# Patient Record
Sex: FEMALE | Race: ASIAN | Employment: FULL TIME | URBAN - METROPOLITAN AREA
[De-identification: names, ages, dates, MRNs, and addresses within clinical notes are randomized per-mention and may not be internally consistent; named-entity substitution may affect disease eponyms.]

---

## 2019-07-02 ENCOUNTER — APPOINTMENT (OUTPATIENT)
Dept: NUCLEAR MEDICINE | Age: 34
DRG: 871 | End: 2019-07-02
Attending: EMERGENCY MEDICINE
Payer: COMMERCIAL

## 2019-07-02 ENCOUNTER — APPOINTMENT (OUTPATIENT)
Dept: CT IMAGING | Age: 34
DRG: 871 | End: 2019-07-02
Attending: EMERGENCY MEDICINE
Payer: COMMERCIAL

## 2019-07-02 ENCOUNTER — APPOINTMENT (OUTPATIENT)
Dept: GENERAL RADIOLOGY | Age: 34
DRG: 871 | End: 2019-07-02
Attending: EMERGENCY MEDICINE
Payer: COMMERCIAL

## 2019-07-02 ENCOUNTER — APPOINTMENT (OUTPATIENT)
Dept: VASCULAR SURGERY | Age: 34
DRG: 871 | End: 2019-07-02
Attending: FAMILY MEDICINE
Payer: COMMERCIAL

## 2019-07-02 ENCOUNTER — HOSPITAL ENCOUNTER (INPATIENT)
Age: 34
LOS: 3 days | Discharge: HOME | DRG: 871 | End: 2019-07-05
Attending: EMERGENCY MEDICINE | Admitting: FAMILY MEDICINE
Payer: COMMERCIAL

## 2019-07-02 ENCOUNTER — APPOINTMENT (OUTPATIENT)
Dept: ULTRASOUND IMAGING | Age: 34
DRG: 871 | End: 2019-07-02
Attending: FAMILY MEDICINE
Payer: COMMERCIAL

## 2019-07-02 DIAGNOSIS — E86.0 SEVERE DEHYDRATION: ICD-10-CM

## 2019-07-02 DIAGNOSIS — I26.99 ACUTE PULMONARY EMBOLISM WITHOUT ACUTE COR PULMONALE, UNSPECIFIED PULMONARY EMBOLISM TYPE (HCC): Primary | ICD-10-CM

## 2019-07-02 DIAGNOSIS — N17.9 AKI (ACUTE KIDNEY INJURY) (HCC): ICD-10-CM

## 2019-07-02 DIAGNOSIS — J40 BRONCHITIS: ICD-10-CM

## 2019-07-02 DIAGNOSIS — A41.9 SEPTIC SHOCK (HCC): ICD-10-CM

## 2019-07-02 DIAGNOSIS — R65.21 SEPTIC SHOCK (HCC): ICD-10-CM

## 2019-07-02 PROBLEM — R06.02 SOB (SHORTNESS OF BREATH): Status: ACTIVE | Noted: 2019-07-02

## 2019-07-02 LAB
ALBUMIN SERPL-MCNC: 3.2 G/DL (ref 3.5–5)
ALBUMIN/GLOB SERPL: 0.5 {RATIO} (ref 1.1–2.2)
ALP SERPL-CCNC: 130 U/L (ref 45–117)
ALT SERPL-CCNC: 22 U/L (ref 12–78)
ANION GAP BLD CALC-SCNC: 22 MMOL/L (ref 10–20)
ANION GAP SERPL CALC-SCNC: 15 MMOL/L (ref 5–15)
APPEARANCE UR: ABNORMAL
APTT PPP: 28.6 SEC (ref 22.1–32)
ARTERIAL PATENCY WRIST A: NO
AST SERPL-CCNC: 53 U/L (ref 15–37)
ATRIAL RATE: 140 BPM
B PERT DNA SPEC QL NAA+PROBE: NOT DETECTED
BACTERIA URNS QL MICRO: NEGATIVE /HPF
BASE DEFICIT BLD-SCNC: 6 MMOL/L
BASOPHILS # BLD: 0 K/UL (ref 0–0.1)
BASOPHILS NFR BLD: 0 % (ref 0–1)
BDY SITE: ABNORMAL
BILIRUB SERPL-MCNC: 1.6 MG/DL (ref 0.2–1)
BILIRUB UR QL CFM: POSITIVE
BNP SERPL-MCNC: 498 PG/ML
BUN BLD-MCNC: 19 MG/DL (ref 9–20)
BUN SERPL-MCNC: 19 MG/DL (ref 6–20)
BUN/CREAT SERPL: 7 (ref 12–20)
C PNEUM DNA SPEC QL NAA+PROBE: NOT DETECTED
CA-I BLD-MCNC: 1 MMOL/L (ref 1.12–1.32)
CALCIUM SERPL-MCNC: 9.4 MG/DL (ref 8.5–10.1)
CALCULATED P AXIS, ECG09: 43 DEGREES
CALCULATED R AXIS, ECG10: 3 DEGREES
CALCULATED T AXIS, ECG11: 20 DEGREES
CHLORIDE BLD-SCNC: 97 MMOL/L (ref 98–107)
CHLORIDE SERPL-SCNC: 93 MMOL/L (ref 97–108)
CK SERPL-CCNC: 230 U/L (ref 26–192)
CO2 BLD-SCNC: 19 MMOL/L (ref 21–32)
CO2 SERPL-SCNC: 22 MMOL/L (ref 21–32)
COLOR UR: ABNORMAL
COMMENT, HOLDF: NORMAL
CREAT BLD-MCNC: 2.7 MG/DL (ref 0.6–1.3)
CREAT SERPL-MCNC: 2.86 MG/DL (ref 0.55–1.02)
D DIMER PPP FEU-MCNC: 12.53 MG/L FEU (ref 0–0.65)
DIAGNOSIS, 93000: NORMAL
DIFFERENTIAL METHOD BLD: ABNORMAL
EOSINOPHIL # BLD: 0 K/UL (ref 0–0.4)
EOSINOPHIL NFR BLD: 0 % (ref 0–7)
EPITH CASTS URNS QL MICRO: ABNORMAL /LPF
ERYTHROCYTE [DISTWIDTH] IN BLOOD BY AUTOMATED COUNT: 13.3 % (ref 11.5–14.5)
FLUAV AG NPH QL IA: NEGATIVE
FLUAV H1 2009 PAND RNA SPEC QL NAA+PROBE: NOT DETECTED
FLUAV H1 RNA SPEC QL NAA+PROBE: NOT DETECTED
FLUAV H3 RNA SPEC QL NAA+PROBE: NOT DETECTED
FLUAV SUBTYP SPEC NAA+PROBE: NOT DETECTED
FLUBV AG NOSE QL IA: NEGATIVE
FLUBV RNA SPEC QL NAA+PROBE: NOT DETECTED
GAS FLOW.O2 O2 DELIVERY SYS: ABNORMAL L/MIN
GAS FLOW.O2 SETTING OXYMISER: 2 L/M
GLOBULIN SER CALC-MCNC: 6.2 G/DL (ref 2–4)
GLUCOSE BLD-MCNC: 113 MG/DL (ref 65–100)
GLUCOSE SERPL-MCNC: 103 MG/DL (ref 65–100)
GLUCOSE UR STRIP.AUTO-MCNC: NEGATIVE MG/DL
HADV DNA SPEC QL NAA+PROBE: NOT DETECTED
HCG SERPL-ACNC: <1 MIU/ML (ref 0–6)
HCO3 BLD-SCNC: 18.1 MMOL/L (ref 22–26)
HCOV 229E RNA SPEC QL NAA+PROBE: NOT DETECTED
HCOV HKU1 RNA SPEC QL NAA+PROBE: NOT DETECTED
HCOV NL63 RNA SPEC QL NAA+PROBE: NOT DETECTED
HCOV OC43 RNA SPEC QL NAA+PROBE: NOT DETECTED
HCT VFR BLD AUTO: 38.1 % (ref 35–47)
HCT VFR BLD CALC: 33 % (ref 35–47)
HGB BLD-MCNC: 12.9 G/DL (ref 11.5–16)
HGB UR QL STRIP: ABNORMAL
HMPV RNA SPEC QL NAA+PROBE: NOT DETECTED
HPIV1 RNA SPEC QL NAA+PROBE: NOT DETECTED
HPIV2 RNA SPEC QL NAA+PROBE: NOT DETECTED
HPIV3 RNA SPEC QL NAA+PROBE: NOT DETECTED
HPIV4 RNA SPEC QL NAA+PROBE: NOT DETECTED
IMM GRANULOCYTES # BLD AUTO: 0 K/UL
IMM GRANULOCYTES NFR BLD AUTO: 0 %
KETONES UR QL STRIP.AUTO: ABNORMAL MG/DL
LACTATE BLD-SCNC: 7.31 MMOL/L (ref 0.4–2)
LACTATE SERPL-SCNC: 1.3 MMOL/L (ref 0.4–2)
LACTATE SERPL-SCNC: 5.5 MMOL/L (ref 0.4–2)
LEUKOCYTE ESTERASE UR QL STRIP.AUTO: NEGATIVE
LIPASE SERPL-CCNC: 139 U/L (ref 73–393)
LYMPHOCYTES # BLD: 0.4 K/UL (ref 0.8–3.5)
LYMPHOCYTES NFR BLD: 4 % (ref 12–49)
M PNEUMO DNA SPEC QL NAA+PROBE: NOT DETECTED
MAGNESIUM SERPL-MCNC: 2.4 MG/DL (ref 1.6–2.4)
MCH RBC QN AUTO: 30.1 PG (ref 26–34)
MCHC RBC AUTO-ENTMCNC: 33.9 G/DL (ref 30–36.5)
MCV RBC AUTO: 89 FL (ref 80–99)
MONOCYTES # BLD: 0.1 K/UL (ref 0–1)
MONOCYTES NFR BLD: 1 % (ref 5–13)
MYELOCYTES NFR BLD MANUAL: 1 %
NEUTS BAND NFR BLD MANUAL: 3 % (ref 0–6)
NEUTS SEG # BLD: 8.3 K/UL (ref 1.8–8)
NEUTS SEG NFR BLD: 91 % (ref 32–75)
NITRITE UR QL STRIP.AUTO: NEGATIVE
NRBC # BLD: 0 K/UL (ref 0–0.01)
NRBC BLD-RTO: 0 PER 100 WBC
P-R INTERVAL, ECG05: 122 MS
PCO2 BLD: 28 MMHG (ref 35–45)
PH BLD: 7.42 [PH] (ref 7.35–7.45)
PH UR STRIP: 5.5 [PH] (ref 5–8)
PLATELET # BLD AUTO: 250 K/UL (ref 150–400)
PMV BLD AUTO: 12.3 FL (ref 8.9–12.9)
PO2 BLD: 88 MMHG (ref 80–100)
POTASSIUM BLD-SCNC: 3.1 MMOL/L (ref 3.5–5.1)
POTASSIUM SERPL-SCNC: 4 MMOL/L (ref 3.5–5.1)
PROT SERPL-MCNC: 9.4 G/DL (ref 6.4–8.2)
PROT UR STRIP-MCNC: 30 MG/DL
Q-T INTERVAL, ECG07: 304 MS
QRS DURATION, ECG06: 82 MS
QTC CALCULATION (BEZET), ECG08: 464 MS
RBC # BLD AUTO: 4.28 M/UL (ref 3.8–5.2)
RBC #/AREA URNS HPF: ABNORMAL /HPF (ref 0–5)
RBC MORPH BLD: ABNORMAL
RSV RNA SPEC QL NAA+PROBE: NOT DETECTED
RV+EV RNA SPEC QL NAA+PROBE: NOT DETECTED
SAMPLES BEING HELD,HOLD: NORMAL
SAO2 % BLD: 97 % (ref 92–97)
SERVICE CMNT-IMP: ABNORMAL
SODIUM BLD-SCNC: 134 MMOL/L (ref 136–145)
SODIUM SERPL-SCNC: 130 MMOL/L (ref 136–145)
SP GR UR REFRACTOMETRY: 1.01 (ref 1–1.03)
SPECIMEN TYPE: ABNORMAL
THERAPEUTIC RANGE,PTTT: NORMAL SECS (ref 58–77)
TROPONIN I SERPL-MCNC: <0.05 NG/ML
TROPONIN I SERPL-MCNC: <0.05 NG/ML
UR CULT HOLD, URHOLD: NORMAL
UROBILINOGEN UR QL STRIP.AUTO: 2 EU/DL (ref 0.2–1)
VENTRICULAR RATE, ECG03: 140 BPM
WBC # BLD AUTO: 8.8 K/UL (ref 3.6–11)
WBC MORPH BLD: ABNORMAL
WBC URNS QL MICRO: ABNORMAL /HPF (ref 0–4)

## 2019-07-02 PROCEDURE — 74011000258 HC RX REV CODE- 258: Performed by: FAMILY MEDICINE

## 2019-07-02 PROCEDURE — 82550 ASSAY OF CK (CPK): CPT

## 2019-07-02 PROCEDURE — 87633 RESP VIRUS 12-25 TARGETS: CPT

## 2019-07-02 PROCEDURE — A9558 XE133 XENON 10MCI: HCPCS

## 2019-07-02 PROCEDURE — 83880 ASSAY OF NATRIURETIC PEPTIDE: CPT

## 2019-07-02 PROCEDURE — 80047 BASIC METABLC PNL IONIZED CA: CPT

## 2019-07-02 PROCEDURE — 80053 COMPREHEN METABOLIC PANEL: CPT

## 2019-07-02 PROCEDURE — 85730 THROMBOPLASTIN TIME PARTIAL: CPT

## 2019-07-02 PROCEDURE — 87899 AGENT NOS ASSAY W/OPTIC: CPT

## 2019-07-02 PROCEDURE — 74011250636 HC RX REV CODE- 250/636: Performed by: INTERNAL MEDICINE

## 2019-07-02 PROCEDURE — 83735 ASSAY OF MAGNESIUM: CPT

## 2019-07-02 PROCEDURE — 83605 ASSAY OF LACTIC ACID: CPT

## 2019-07-02 PROCEDURE — 85379 FIBRIN DEGRADATION QUANT: CPT

## 2019-07-02 PROCEDURE — 87804 INFLUENZA ASSAY W/OPTIC: CPT

## 2019-07-02 PROCEDURE — 65660000000 HC RM CCU STEPDOWN

## 2019-07-02 PROCEDURE — 71045 X-RAY EXAM CHEST 1 VIEW: CPT

## 2019-07-02 PROCEDURE — 82803 BLOOD GASES ANY COMBINATION: CPT

## 2019-07-02 PROCEDURE — 74011250637 HC RX REV CODE- 250/637: Performed by: EMERGENCY MEDICINE

## 2019-07-02 PROCEDURE — 83690 ASSAY OF LIPASE: CPT

## 2019-07-02 PROCEDURE — 81001 URINALYSIS AUTO W/SCOPE: CPT

## 2019-07-02 PROCEDURE — 87449 NOS EACH ORGANISM AG IA: CPT

## 2019-07-02 PROCEDURE — 96365 THER/PROPH/DIAG IV INF INIT: CPT

## 2019-07-02 PROCEDURE — 74011250636 HC RX REV CODE- 250/636: Performed by: EMERGENCY MEDICINE

## 2019-07-02 PROCEDURE — 36415 COLL VENOUS BLD VENIPUNCTURE: CPT

## 2019-07-02 PROCEDURE — 99285 EMERGENCY DEPT VISIT HI MDM: CPT

## 2019-07-02 PROCEDURE — 93005 ELECTROCARDIOGRAM TRACING: CPT

## 2019-07-02 PROCEDURE — 71250 CT THORAX DX C-: CPT

## 2019-07-02 PROCEDURE — 87040 BLOOD CULTURE FOR BACTERIA: CPT

## 2019-07-02 PROCEDURE — 85025 COMPLETE CBC W/AUTO DIFF WBC: CPT

## 2019-07-02 PROCEDURE — 96361 HYDRATE IV INFUSION ADD-ON: CPT

## 2019-07-02 PROCEDURE — 94640 AIRWAY INHALATION TREATMENT: CPT

## 2019-07-02 PROCEDURE — 74011000258 HC RX REV CODE- 258: Performed by: EMERGENCY MEDICINE

## 2019-07-02 PROCEDURE — 99218 HC RM OBSERVATION: CPT

## 2019-07-02 PROCEDURE — 74011250637 HC RX REV CODE- 250/637: Performed by: FAMILY MEDICINE

## 2019-07-02 PROCEDURE — 96366 THER/PROPH/DIAG IV INF ADDON: CPT

## 2019-07-02 PROCEDURE — 76770 US EXAM ABDO BACK WALL COMP: CPT

## 2019-07-02 PROCEDURE — 93970 EXTREMITY STUDY: CPT

## 2019-07-02 PROCEDURE — 96375 TX/PRO/DX INJ NEW DRUG ADDON: CPT

## 2019-07-02 PROCEDURE — 36600 WITHDRAWAL OF ARTERIAL BLOOD: CPT

## 2019-07-02 PROCEDURE — 84484 ASSAY OF TROPONIN QUANT: CPT

## 2019-07-02 PROCEDURE — 84702 CHORIONIC GONADOTROPIN TEST: CPT

## 2019-07-02 PROCEDURE — 74011000250 HC RX REV CODE- 250: Performed by: FAMILY MEDICINE

## 2019-07-02 PROCEDURE — 74011250636 HC RX REV CODE- 250/636: Performed by: FAMILY MEDICINE

## 2019-07-02 RX ORDER — HEPARIN SODIUM 5000 [USP'U]/ML
80 INJECTION, SOLUTION INTRAVENOUS; SUBCUTANEOUS ONCE
Status: COMPLETED | OUTPATIENT
Start: 2019-07-02 | End: 2019-07-02

## 2019-07-02 RX ORDER — ACETAMINOPHEN 325 MG/1
650 TABLET ORAL
Status: DISCONTINUED | OUTPATIENT
Start: 2019-07-02 | End: 2019-07-05 | Stop reason: HOSPADM

## 2019-07-02 RX ORDER — ACETAMINOPHEN 500 MG
1000 TABLET ORAL
Status: COMPLETED | OUTPATIENT
Start: 2019-07-02 | End: 2019-07-02

## 2019-07-02 RX ORDER — SODIUM CHLORIDE, SODIUM LACTATE, POTASSIUM CHLORIDE, CALCIUM CHLORIDE 600; 310; 30; 20 MG/100ML; MG/100ML; MG/100ML; MG/100ML
75 INJECTION, SOLUTION INTRAVENOUS CONTINUOUS
Status: DISCONTINUED | OUTPATIENT
Start: 2019-07-02 | End: 2019-07-05 | Stop reason: HOSPADM

## 2019-07-02 RX ORDER — SODIUM CHLORIDE 0.9 % (FLUSH) 0.9 %
10 SYRINGE (ML) INJECTION
Status: ACTIVE | OUTPATIENT
Start: 2019-07-02 | End: 2019-07-02

## 2019-07-02 RX ORDER — SODIUM CHLORIDE 9 MG/ML
100 INJECTION, SOLUTION INTRAVENOUS CONTINUOUS
Status: DISCONTINUED | OUTPATIENT
Start: 2019-07-02 | End: 2019-07-02

## 2019-07-02 RX ORDER — IPRATROPIUM BROMIDE AND ALBUTEROL SULFATE 2.5; .5 MG/3ML; MG/3ML
3 SOLUTION RESPIRATORY (INHALATION)
Status: DISCONTINUED | OUTPATIENT
Start: 2019-07-02 | End: 2019-07-03

## 2019-07-02 RX ORDER — VANCOMYCIN 2 GRAM/500 ML IN 0.9 % SODIUM CHLORIDE INTRAVENOUS
2000 ONCE
Status: COMPLETED | OUTPATIENT
Start: 2019-07-02 | End: 2019-07-02

## 2019-07-02 RX ORDER — LEVOFLOXACIN 5 MG/ML
750 INJECTION, SOLUTION INTRAVENOUS
Status: DISCONTINUED | OUTPATIENT
Start: 2019-07-02 | End: 2019-07-02

## 2019-07-02 RX ORDER — SODIUM CHLORIDE 0.9 % (FLUSH) 0.9 %
5-40 SYRINGE (ML) INJECTION AS NEEDED
Status: DISCONTINUED | OUTPATIENT
Start: 2019-07-02 | End: 2019-07-05 | Stop reason: HOSPADM

## 2019-07-02 RX ORDER — NORETHINDRONE ACETATE AND ETHINYL ESTRADIOL 1MG-20(21)
1 KIT ORAL DAILY
COMMUNITY

## 2019-07-02 RX ORDER — SODIUM CHLORIDE 0.9 % (FLUSH) 0.9 %
5-40 SYRINGE (ML) INJECTION EVERY 8 HOURS
Status: DISCONTINUED | OUTPATIENT
Start: 2019-07-02 | End: 2019-07-05 | Stop reason: HOSPADM

## 2019-07-02 RX ORDER — HEPARIN SODIUM 10000 [USP'U]/100ML
18-36 INJECTION, SOLUTION INTRAVENOUS
Status: DISCONTINUED | OUTPATIENT
Start: 2019-07-02 | End: 2019-07-02

## 2019-07-02 RX ORDER — ONDANSETRON 2 MG/ML
4 INJECTION INTRAMUSCULAR; INTRAVENOUS
Status: COMPLETED | OUTPATIENT
Start: 2019-07-02 | End: 2019-07-02

## 2019-07-02 RX ORDER — SODIUM CHLORIDE 0.9 % (FLUSH) 0.9 %
5-10 SYRINGE (ML) INJECTION AS NEEDED
Status: DISCONTINUED | OUTPATIENT
Start: 2019-07-02 | End: 2019-07-05 | Stop reason: HOSPADM

## 2019-07-02 RX ORDER — SODIUM CHLORIDE 9 MG/ML
75 INJECTION, SOLUTION INTRAVENOUS CONTINUOUS
Status: DISCONTINUED | OUTPATIENT
Start: 2019-07-02 | End: 2019-07-02 | Stop reason: SDUPTHER

## 2019-07-02 RX ORDER — BENZONATATE 100 MG/1
100 CAPSULE ORAL
Status: DISCONTINUED | OUTPATIENT
Start: 2019-07-02 | End: 2019-07-05 | Stop reason: HOSPADM

## 2019-07-02 RX ORDER — BENZONATATE 100 MG/1
100 CAPSULE ORAL ONCE
Status: COMPLETED | OUTPATIENT
Start: 2019-07-02 | End: 2019-07-02

## 2019-07-02 RX ORDER — AZITHROMYCIN 250 MG/1
250 TABLET, FILM COATED ORAL DAILY
COMMUNITY
Start: 2019-06-30 | End: 2019-07-05

## 2019-07-02 RX ADMIN — BENZONATATE 100 MG: 100 CAPSULE ORAL at 13:09

## 2019-07-02 RX ADMIN — ACETAMINOPHEN 1000 MG: 500 TABLET ORAL at 15:34

## 2019-07-02 RX ADMIN — SODIUM CHLORIDE, SODIUM LACTATE, POTASSIUM CHLORIDE, AND CALCIUM CHLORIDE 1000 ML: 600; 310; 30; 20 INJECTION, SOLUTION INTRAVENOUS at 12:19

## 2019-07-02 RX ADMIN — METHYLPREDNISOLONE SODIUM SUCCINATE 60 MG: 40 INJECTION, POWDER, FOR SOLUTION INTRAMUSCULAR; INTRAVENOUS at 22:20

## 2019-07-02 RX ADMIN — VANCOMYCIN HYDROCHLORIDE 2000 MG: 10 INJECTION, POWDER, LYOPHILIZED, FOR SOLUTION INTRAVENOUS at 20:55

## 2019-07-02 RX ADMIN — BENZONATATE 100 MG: 100 CAPSULE ORAL at 18:38

## 2019-07-02 RX ADMIN — CEFEPIME HYDROCHLORIDE 2 G: 2 INJECTION, POWDER, FOR SOLUTION INTRAVENOUS at 18:39

## 2019-07-02 RX ADMIN — SODIUM CHLORIDE, SODIUM LACTATE, POTASSIUM CHLORIDE, AND CALCIUM CHLORIDE 100 ML/HR: 600; 310; 30; 20 INJECTION, SOLUTION INTRAVENOUS at 18:48

## 2019-07-02 RX ADMIN — CEFTRIAXONE SODIUM 2 G: 2 INJECTION, POWDER, FOR SOLUTION INTRAMUSCULAR; INTRAVENOUS at 11:32

## 2019-07-02 RX ADMIN — SODIUM CHLORIDE, SODIUM LACTATE, POTASSIUM CHLORIDE, AND CALCIUM CHLORIDE 1000 ML: 600; 310; 30; 20 INJECTION, SOLUTION INTRAVENOUS at 12:26

## 2019-07-02 RX ADMIN — HEPARIN SODIUM 18 UNITS/KG/HR: 10000 INJECTION, SOLUTION INTRAVENOUS at 12:42

## 2019-07-02 RX ADMIN — IPRATROPIUM BROMIDE AND ALBUTEROL SULFATE 3 ML: .5; 3 SOLUTION RESPIRATORY (INHALATION) at 21:21

## 2019-07-02 RX ADMIN — SODIUM CHLORIDE 1000 ML: 900 INJECTION, SOLUTION INTRAVENOUS at 16:31

## 2019-07-02 RX ADMIN — LEVOFLOXACIN 750 MG: 5 INJECTION, SOLUTION INTRAVENOUS at 16:31

## 2019-07-02 RX ADMIN — METHYLPREDNISOLONE SODIUM SUCCINATE 60 MG: 40 INJECTION, POWDER, FOR SOLUTION INTRAMUSCULAR; INTRAVENOUS at 16:31

## 2019-07-02 RX ADMIN — SODIUM CHLORIDE, SODIUM LACTATE, POTASSIUM CHLORIDE, AND CALCIUM CHLORIDE 1000 ML: 600; 310; 30; 20 INJECTION, SOLUTION INTRAVENOUS at 11:13

## 2019-07-02 RX ADMIN — HEPARIN SODIUM 6450 UNITS: 5000 INJECTION INTRAVENOUS; SUBCUTANEOUS at 12:41

## 2019-07-02 RX ADMIN — SODIUM CHLORIDE 1000 ML: 900 INJECTION, SOLUTION INTRAVENOUS at 11:12

## 2019-07-02 RX ADMIN — ONDANSETRON 4 MG: 2 INJECTION INTRAMUSCULAR; INTRAVENOUS at 11:17

## 2019-07-02 RX ADMIN — DOXYCYCLINE 100 MG: 100 INJECTION, POWDER, LYOPHILIZED, FOR SOLUTION INTRAVENOUS at 14:46

## 2019-07-02 NOTE — CONSULTS
Infectious Disease Consult    Today's Date: 7/2/2019   Admit Date: 7/2/2019    Impression:   · Febrile illness with productive cough  · No specific risks/exposures other than recent travel to [place]urban sites  · BROCK, lactic acidosis    Plan:   · Cultures   · Respiratory PCR  · Broad coverage for typical/atypical organisms--change Levaquin to doxycycline    Anti-infectives:   · Vancomycin   · Cefepime  · Levaquin    Subjective:   Date of Consultation:  July 2, 2019  Referring Physician: Dr Matt Lobo    Patient is a 26 y.o. female admitted with progressive productive cough, dyspnea and fever. She has been ill for about ten days and states that she has had cough productive of yellowish sputum that has progressed to blood-streaking of the sputum. She has had fevers and chills. She has no specific exposures, risks, etc that I can determine. Patient Active Problem List   Diagnosis Code    Hypercholesterolemia E78.00    Pulmonary emboli (HCC) I26.99    SOB (shortness of breath) R06.02     Past Medical History:   Diagnosis Date    Back pain     L5 S1 Disk    Hypercholesterolemia       Family History   Problem Relation Age of Onset    Liver Disease Mother     High Cholesterol Mother     Elevated Lipids Mother     Liver Disease Father     Hypertension Father       Social History     Tobacco Use    Smoking status: Never Smoker   Substance Use Topics    Alcohol use: Yes     Alcohol/week: 0.0 oz     Types: 1 - 2 Glasses of wine per week     Comment: seldom     History reviewed. No pertinent surgical history. Prior to Admission medications    Medication Sig Start Date End Date Taking? Authorizing Provider   norethindrone-ethinyl estradiol (JUNEL FE 1/20, 28,) 1 mg-20 mcg (21)/75 mg (7) tab Take 1 Tab by mouth daily. Yes Provider, Historical   azithromycin (ZITHROMAX) 250 mg tablet Take 250 mg by mouth daily.  6/30/19 7/4/19 Yes Provider, Historical       Allergies   Allergen Reactions    Latex Other (comments)        Review of Systems:  Pertinent items are noted in the History of Present Illness. Objective:     Visit Vitals  /50   Pulse (!) 108   Temp 100.4 °F (38 °C)   Resp (!) 36   Wt 80.9 kg (178 lb 5.6 oz)   SpO2 98%   BMI 31.10 kg/m²     Temp (24hrs), Av.7 °F (37.6 °C), Min:98.9 °F (37.2 °C), Max:100.4 °F (38 °C)       Lines:  Peripheral IV:       Physical Exam:  General:  fatigued, mild distress  Neck:  normal and no erythema or exudates noted. Lungs:  rhonchi R base, L base  Heart:  regular rate and rhythm  Abdomen:  soft, non-tender. Bowel sounds normal. No masses,  no organomegaly  Skin:  no rash or abnormalities    Data Review:     CBC:  Recent Labs     19  1104   WBC 8.8   GRANS 91*   MONOS 1*   EOS 0   ANEU 8.3*   ABL 0.4*   HGB 12.9   HCT 38.1          BMP:  Recent Labs     19  1104   CREA 2.86*   BUN 19   *   K 4.0   CL 93*   CO2 22   AGAP 15   *       LFTS:  Recent Labs     19  1104   TBILI 1.6*   ALT 22   SGOT 53*   *   TP 9.4*   ALB 3.2*       Microbiology:     All Micro Results     Procedure Component Value Units Date/Time    INFLUENZA A & B AG (RAPID TEST) [[id number]] Collected:  19    Order Status:  Completed Specimen:  Nasopharyngeal from Nasal washing Updated:  19 180     Influenza A Antigen NEGATIVE         Influenza B Antigen NEGATIVE        CULTURE, MRSA [[id number]] Collected:  19    Order Status:  Completed Specimen:  Nares Updated:  19    URINE CULTURE HOLD SAMPLE [[id number]] Collected:  19    Order Status:  Completed Specimen:  Urine from Serum Updated:  19     Urine culture hold       URINE ON HOLD IN MICROBIOLOGY DEPT FOR 3 DAYS. IF UNPRESERVED URINE IS SUBMITTED, IT CANNOT BE USED FOR ADDITIONAL TESTING AFTER 24 HRS, RECOLLECTION WILL BE REQUIRED. NIDHI Ortiz, UR/CSF [[id number]]     Order Status:  Sent Specimen:  Other     LEGIONELLA PNEUMOPHILA AG, URINE [[id number]]     Order Status:  Sent Specimen:  Urine     RESPIRATORY PANEL,PCR,NASOPHARYNGEAL [[id number]]     Order Status:  Sent Specimen:  NASOPHARYNGEAL SWAB     INFLUENZA A & B AG (RAPID TEST) [[id number]]     Order Status:  Sent Specimen:  Nasopharyngeal     CULTURE, BLOOD, PAIRED [[id number]] Collected:  07/02/19 1104    Order Status:  Completed Specimen:  Blood Updated:  07/02/19 1152          Imaging:   Reviewed     Signed By: Trisha Man MD     July 2, 2019

## 2019-07-02 NOTE — H&P
[facility]  HISTORY AND PHYSICAL    Name:  Chele Ibarra  MR#:  [id number]  :  1985  ACCOUNT #:  [de-identified]  ADMIT DATE:  2019    CHIEF COMPLAINT:  Shortness of breath. HISTORY OF PRESENT ILLNESS:  The patient is a 17-year-old female with past medical history of hypercholesteremia and back pain, currently on OCPs, who presents to the hospital with the above-mentioned symptom. History was obtained from the patient. The patient appears to be in quite a distress. Reports that she started having flu-like symptoms about 9 days back. The patient reports that she had a cough associated with mild productive sputum which intermittently had \"specs of blood in it. \"  Also had some rhinorrhea and this was while she was back in [place] for a vacation. The patient reports that she came back to [place], continued to have shortness of breath, rhinorrhea and started developing headache. She took some Advil, but that did not seem to help, got concerned and went to [facility].  The patient reports meanwhile she had poor appetite, trouble eating and drinking, fatigue, had a fever of around 101, chills and chest pain. The patient reports that last Saturday, she went to [facility], it was thought that she has bronchitis, was given Zithromax, started taking her medication, but symptoms persisted. The patient reports that she went to [facility] again. They were concerned that the chest x-ray shows pneumonia, and she was sent to the ER for further management and evaluation. While sitting in bed, the patient appears to be quite tachypneic, has been taking shallow respirations. Reports that she has pain all over her chest in a band-like fashion, because of which she is not able to breathe very well. The patient reports that she has not had any history of blood clots and denies any swelling in her legs. The patient denies any tick bites, rashes or any other concerns or problems. The patient denies any respiratory illnesses in the past.  Denies any sick contacts. The patient currently denies any headache, blurry vision, sore throat, trouble swallowing, trouble with speech, any abdominal pain, constipation, diarrhea, urinary symptoms, focal or generalized neurological weakness, recent travel besides to [place], any falls, injuries, hematemesis, melena, hemoptysis, hematuria or any other concerns or problems. PAST MEDICAL HISTORY:  See above. HOME MEDICATIONS:  Currently, the patient is on Jennifer Sanon for birth control. ALLERGIES:  TO LATEX. SOCIAL HISTORY:  Denies tobacco abuse, occasional alcohol, denies IV drug abuse. Lives at home. FAMILY HISTORY:  Mother has history of liver disease. Mother has history of high cholesterol and elevated lipids. Father has history of liver disease. REVIEW OF SYMPTOMS:  All systems were reviewed and found to be essentially negative except for the symptoms mentioned above. PHYSICAL EXAMINATION:  VITAL SIGNS:  Temperature on arrival was 100.4, pulse 122, respiratory rate 31, blood pressure 106/65, pulse oximetry 96% on room air. GENERAL:  Alert, oriented x3, awake, moderately distressed, pleasant female, appears to be stated age. HEENT:  Pupils equal and reactive to light. Dry mucous membranes. Tympanic membranes clear. NECK:  Supple. CHEST:  Clear to auscultation bilaterally. HEART:  S1, S2 are heard, with shallow respirations. ABDOMEN:  Soft, nontender, nondistended. Bowel sounds are physiological.  EXTREMITIES:  No clubbing, no cyanosis, no edema. NEURO/PSYCH:  Pleasant mood and affect. Cranial nerves II through XII are grossly intact. No focal neurological deficits are noted. SKIN:  Warm. LABORATORY DATA:  White count 8.8, hemoglobin 12.9, hematocrit 38.1, platelets 184.   Sodium 130, potassium 4.0, chloride 93, bicarbonate 22, anion gap 15, glucose 103, BUN 19, creatinine 2.86, calcium 9.4, magnesium 2.4, bilirubin total 1.6, ALT 22, AST 53, alkaline phosphatase 130. Troponin less than 0.05. Beta HCG is less than 1. ABG shows a pH of 7.419, pCO2 of 28, pO2 of 88. Lactic acid on presentation was 7.31.  D-dimer was 12.53. PTT was 28.6. Repeat lactate was 5.5. CT of the chest shows multiple lung nodules. V/Q scan of the chest shows low probability for PE, MAC disease in the left lung base correlates with airspace disease, possible atelectasis on a CT. Renal ultrasound is pending. ASSESSMENT AND PLAN:  1. Shortness of breath with possible early sepsis:  Unclear etiology. Suspect underlying infection. V/Q scan with low probability of pulmonary embolism. We will start the patient on IV hydration, broad-spectrum antibiotics, DuoNebs, pulse oximetry monitoring, supportive care and continue to monitor. We will get influenza testing done. Pulmonary consult has been obtained and we will await Pulmonary input and continue to closely monitor. Further intervention per hospital course. We will also get echocardiogram and cycle troponins. May consider further imaging and diagnostics if symptoms persist.  Monitor for now. 2.  Acute renal failure:  Likely prerenal.  Start the patient on IV hydration, renal ultrasound, avoid nephrotoxic medication, renally dose all other medications. Repeat labs in the morning and continue to monitor. Further intervention per hospital course. Reassess as needed. 3.  Lactic acidosis:  Likely secondary to #1, possible impending sepsis. We will start the patient on IV hydration, provide broad-spectrum antibiotics, IV fluids, supportive care and close monitoring. Further intervention per hospital course. Reassess as needed. Trend white count. Monitor for now. 4.  Hyponatremia:  Mild, likely secondary to #1. IV hydration. Repeat labs in the morning and continue to monitor. 5.  Gastrointestinal and deep venous thrombosis prophylaxes:   The patient will be on sequential compression devices.       Juan Bar MD [id number]  D:  07/02/2019 16:12  T:  07/02/2019 17:33  JOB #:  [id number]

## 2019-07-02 NOTE — ED NOTES
Bedside and Verbal shift change report given to Ebony Christianson (oncoming nurse) by [address] (offgoing nurse). Report included the following information SBAR, Kardex, ED Summary, [facility] and Recent Results.

## 2019-07-02 NOTE — ROUTINE PROCESS
TRANSFER - OUT REPORT: 
 
Verbal report given to MATTHEW Vazquez(name) on Rainer Freeman  being transferred to [facility](unit) for routine progression of care Report consisted of patients Situation, Background, Assessment and  
Recommendations(SBAR). Information from the following report(s) SBAR, Kardex, Intake/Output, MAR and Recent Results was reviewed with the receiving nurse. Lines:  
Peripheral IV 07/02/19 Left Wrist (Active) Site Assessment Clean, dry, & intact 7/2/2019 11:15 AM  
Phlebitis Assessment 0 7/2/2019 11:15 AM  
Infiltration Assessment 0 7/2/2019 11:15 AM  
Dressing Status Clean, dry, & intact 7/2/2019 11:15 AM  
   
Peripheral IV 07/02/19 Right Wrist (Active) Site Assessment Clean, dry, & intact 7/2/2019 12:34 PM  
Phlebitis Assessment 0 7/2/2019 12:34 PM  
Infiltration Assessment 0 7/2/2019 12:34 PM  
Dressing Status Clean, dry, & intact 7/2/2019 12:34 PM  
Dressing Type Transparent 7/2/2019 12:34 PM  
Hub Color/Line Status Pink;Capped;Flushed;Patent 7/2/2019 12:34 PM  
Alcohol Cap Used Yes 7/2/2019 12:34 PM  
  
 
Opportunity for questions and clarification was provided.    
 
Patient currently in Vascular, patient will transport to floor following exam 
 
 
 
 
\

## 2019-07-02 NOTE — PROGRESS NOTES
TRANSFER - IN REPORT:    Verbal report received from humberto(trenton) on Desiree Tipton  being received from ED(unit) for routine progression of care      Report consisted of patients Situation, Background, Assessment and   Recommendations(SBAR). Information from the following report(s) SBAR, MAR and Recent Results was reviewed with the receiving nurse. Opportunity for questions and clarification was provided. Assessment completed upon patients arrival to unit and care assumed.

## 2019-07-02 NOTE — PROGRESS NOTES
Admission Medication Reconciliation:    Information obtained from:  Patient/RxQuery    Comments/Recommendations: Updated PTA meds/reviewed patient's allergies. 1)  Spoke with patient in the room. Patient states that she hasn't taken her medications past ~2 days. 2)  Medication changes (since last review): Added  - Azithromycin 250mg 2 tabs on day 1, 1tab po daily thereafter    Removed  - Simvastatin 10mg 1tab po daily  - Fenofibrate 54mg 1tab po daily       Allergies:  Latex    Significant PMH/Disease States:   Past Medical History:   Diagnosis Date    Back pain     L5 S1 Disk    Hypercholesterolemia        Chief Complaint for this Admission:    Chief Complaint   Patient presents with    Cough       Prior to Admission Medications:   Prior to Admission Medications   Prescriptions Last Dose Informant Patient Reported? Taking? azithromycin (ZITHROMAX) 250 mg tablet   Yes Yes   Sig: Take 250 mg by mouth daily. norethindrone-ethinyl estradiol (JUNEL FE 1/20, 28,) 1 mg-20 mcg (21)/75 mg (7) tab   Yes Yes   Sig: Take 1 Tab by mouth daily.       Facility-Administered Medications: None

## 2019-07-02 NOTE — CONSULTS
Name: [facility]: [facility]   : 1985 Admit Date: 2019   Phone: [phone]  Room: [place] [address]   PCP: None  MRN: [id number]   Date: 2019  Code: No Order        HPI:    4:36 PM       History was obtained from patient. I was asked by Alfredo Suresh MD to see Janeth Amaya in consultation for a chief complaint of chest pain. History of Present Illness: 26year old female with past medical history as given below who presented with chest pressure and cough productive of yellow to bloody sputum. Patient developed flu like symptoms with severe rigors 10 days back. She also has cough with green yellow sputum. Went to Urgent care and was prescribed Zithromax that she only took for 3 days. 3 days started having more cough productive of green sputum and today morning developed blood tinged sputum that prompted her to come to the hospital.    Has developed chest pressure and coughing on deep inspiration. No nausea, vomiting or diarrhea but was very fatigued and did not drink much water. No uop since morning. Data:  Wbc 8.8 (3% bands)  Cr 2.86  Lactate 5.5  VQ scan low prob. CT Chest: bilateral sub pleural nodules, some of them cavitary. ABg with resp alkaosis and met acidosis. Received 4 liters of iv fluids. ABg doxy, cefepime and levaquin. Past Medical History:   Diagnosis Date    Back pain     L5 S1 Disk    Hypercholesterolemia        History reviewed. No pertinent surgical history. Family History   Problem Relation Age of Onset    Liver Disease Mother     High Cholesterol Mother     Elevated Lipids Mother     Liver Disease Father     Hypertension Father        Social History     Tobacco Use    Smoking status: Never Smoker   Substance Use Topics    Alcohol use:  Yes     Alcohol/week: 0.0 oz     Types: 1 - 2 Glasses of wine per week     Comment: seldom       Allergies   Allergen Reactions    Latex Other (comments)       Current Facility-Administered Medications   Medication Dose Route Frequency    sodium chloride (NS) flush 5-10 mL  5-10 mL IntraVENous PRN    sodium chloride 0.9 % bolus infusion 100 mL  100 mL IntraVENous RAD ONCE    iopamidol (ISOVUE-370) 76 % injection 100 mL  100 mL IntraVENous RAD ONCE    sodium chloride (NS) flush 10 mL  10 mL IntraVENous RAD ONCE    levoFLOXacin (LEVAQUIN) 750 mg in D5W IVPB  750 mg IntraVENous Q48H    albuterol-ipratropium (DUO-NEB) 2.5 MG-0.5 MG/3 ML  3 mL Nebulization Q4H RT    methylPREDNISolone (PF) (SOLU-MEDROL) injection 60 mg  60 mg IntraVENous Q8H    0.9% sodium chloride infusion  100 mL/hr IntraVENous CONTINUOUS    sodium chloride 0.9 % bolus infusion 1,000 mL  1,000 mL IntraVENous ONCE     Current Outpatient Medications   Medication Sig    norethindrone-ethinyl estradiol (JUNEL FE 1/20, 28,) 1 mg-20 mcg (21)/75 mg (7) tab Take 1 Tab by mouth daily.  azithromycin (ZITHROMAX) 250 mg tablet Take 250 mg by mouth daily. REVIEW OF SYSTEMS   Negative except as stated in the HPI. Physical Exam:   Visit Vitals  /65 (BP 1 Location: Left arm, BP Patient Position: At rest)   Pulse (!) 122   Temp 100.4 °F (38 °C)   Resp (!) 31   Wt 80.9 kg (178 lb 5.6 oz)   SpO2 96%   BMI 31.10 kg/m²       General:  Alert, cooperative, no distress, appears stated age. Head:  Normocephalic, without obvious abnormality, atraumatic. Eyes:  Conjunctivae/corneas clear. PERRL, EOMs intact. Nose: Nares normal. Septum midline. Mucosa normal. No drainage or sinus tenderness. Throat: Lips, mucosa, and tongue normal. Teeth and gums normal.   Neck: Supple, symmetrical, trachea midline, no adenopathy, thyroid: no enlargment/tenderness/nodules, no carotid bruit and no JVD. Back:   Symmetric, no curvature. ROM normal.   Lungs:   Bilateral rhonchi   Chest wall:  No tenderness or deformity. Heart:  Regular rate and rhythm, S1, S2 normal, no murmur, click, rub or gallop.    Abdomen:   Soft, non-tender. Bowel sounds normal. No masses,  No organomegaly. Extremities: Extremities normal, atraumatic, no cyanosis or edema. Pulses: 2+ and symmetric all extremities. Skin: Skin color, texture, turgor normal. No rashes or lesions   Lymph nodes: Cervical, supraclavicular, and axillary nodes normal.   Neurologic: Grossly nonfocal       Lab Results   Component Value Date/Time    Sodium 130 (L) 07/02/2019 11:04 AM    Potassium 4.0 07/02/2019 11:04 AM    Chloride 93 (L) 07/02/2019 11:04 AM    CO2 22 07/02/2019 11:04 AM    BUN 19 07/02/2019 11:04 AM    Creatinine 2.86 (H) 07/02/2019 11:04 AM    Glucose 103 (H) 07/02/2019 11:04 AM    Calcium 9.4 07/02/2019 11:04 AM    Magnesium 2.4 07/02/2019 11:04 AM    Lactic acid 5.5 (HH) 07/02/2019 01:37 PM       Lab Results   Component Value Date/Time    WBC 8.8 07/02/2019 11:04 AM    HGB 12.9 07/02/2019 11:04 AM    PLATELET 926 78/94/9347 11:04 AM    MCV 89.0 07/02/2019 11:04 AM       Lab Results   Component Value Date/Time    aPTT 28.6 07/02/2019 12:25 PM    AST (SGOT) 53 (H) 07/02/2019 11:04 AM    Alk. phosphatase 130 (H) 07/02/2019 11:04 AM    Protein, total 9.4 (H) 07/02/2019 11:04 AM    Albumin 3.2 (L) 07/02/2019 11:04 AM    Globulin 6.2 (H) 07/02/2019 11:04 AM       Lab Results   Component Value Date/Time    PHI 7.419 07/02/2019 11:44 AM    PCO2I 28.0 (L) 07/02/2019 11:44 AM    PO2I 88 07/02/2019 11:44 AM    HCO3I 18.1 (L) 07/02/2019 11:44 AM       Lab Results   Component Value Date/Time    Troponin-I, Qt. <0.05 07/02/2019 11:04 AM       IMPRESSION:  ===========  -Sepsis; suspect from community acquired pneumonia.  -Lactic acidosis from dehydration/ sepsis. -BROCK. -Bilateral pulmonary nodule / pleural based - septic emboli not ruled out. PLAN:  ====  -Iv fluids. -ABX - add vanco. C/w cefepime and levaquin. No need for doxy.  -check RVP. -check urinary antigen for legionella and pneumoccal.  -Kidney US and renal consult, if renal functions do not reverse.   -ID consult.  -Echo for valves.     D/w Carli Schrader MD

## 2019-07-02 NOTE — ED PROVIDER NOTES
26 y.o. female with past medical history significant for hypercholesterolemia, back pain, who presents from with referral for chief complaint of cough. Pt has 9 days onset of an worsening, productive cough that's intermittently bloody and began after coming back from vacation in [place]. She had accompanying rhinorrhea and a HA at that time; advil taken for minimal relief. Pt's sx continued to deteriorate as she developed subsequent difficulty eating/drinking, fatigue, fever (peak 101), chills, b/l chest pain, and diaphoresis last Wednesday. Pt sought evaluation at [facility] last Saturday and was dx w/ bronchitis; rx Zithromax. Despite taking medication as instructed, pt continued to be symptomatic and sought repeat evaluation today at [facility]; cxr showed pna, referred to ED. No recent admission. Currently on birth control. There are no other acute medical concerns at this time. PCP: Kun Obrien MD    Note written by Nas Manriquez, as dictated by Joselin Zapata MD 10:55 AM     The history is provided by the patient. No  was used. Past Medical History:   Diagnosis Date    Back pain     L5 S1 Disk    Hypercholesterolemia        History reviewed. No pertinent surgical history.       Family History:   Problem Relation Age of Onset    Liver Disease Mother     High Cholesterol Mother     Elevated Lipids Mother     Liver Disease Father     Hypertension Father        Social History     Socioeconomic History    Marital status: SINGLE     Spouse name: Not on file    Number of children: Not on file    Years of education: Not on file    Highest education level: Not on file   Occupational History    Not on file   Social Needs    Financial resource strain: Not on file    Food insecurity:     Worry: Not on file     Inability: Not on file    Transportation needs:     Medical: Not on file     Non-medical: Not on file   Tobacco Use    Smoking status: Never Smoker Substance and Sexual Activity    Alcohol use: Yes     Alcohol/week: 0.0 oz     Types: 1 - 2 Glasses of wine per week     Comment: seldom    Drug use: No    Sexual activity: Not on file   Lifestyle    Physical activity:     Days per week: Not on file     Minutes per session: Not on file    Stress: Not on file   Relationships    Social connections:     Talks on phone: Not on file     Gets together: Not on file     Attends Rastafari service: Not on file     Active member of club or organization: Not on file     Attends meetings of clubs or organizations: Not on file     Relationship status: Not on file    Intimate partner violence:     Fear of current or ex partner: Not on file     Emotionally abused: Not on file     Physically abused: Not on file     Forced sexual activity: Not on file   Other Topics Concern    Not on file   Social History Narrative    Not on file         ALLERGIES: Latex    Review of Systems   Constitutional: Positive for appetite change, chills, diaphoresis, fatigue and fever. HENT: Positive for rhinorrhea. Respiratory: Positive for cough. Neurological: Positive for headaches. All other systems reviewed and are negative. Vitals:    07/02/19 1145 07/02/19 1230 07/02/19 1300 07/02/19 1447   BP: 118/66 109/62 115/66 106/65   Pulse: (!) 137 (!) 131 (!) 129 (!) 122   Resp: (!) 31 (!) 32 (!) 37 (!) 31   Temp:    100.4 °F (38 °C)   SpO2: 95% 99% 99% 96%   Weight:                Physical Exam   Constitutional: She appears well-developed and well-nourished. She appears ill. No distress. HENT:   Head: Normocephalic and atraumatic. Eyes: Conjunctivae are normal.   Neck: Neck supple. Cardiovascular: Regular rhythm. Tachycardia present. Pulmonary/Chest: Effort normal and breath sounds normal. No respiratory distress. Abdominal: She exhibits no distension. There is tenderness in the epigastric area. Musculoskeletal: Normal range of motion. She exhibits no deformity. Neurological: She is alert. No cranial nerve deficit. Skin: Skin is warm and dry. Psychiatric: Her behavior is normal.   Nursing note and vitals reviewed. Note written by Nas Collazo, as dictated by Kennedy Nieto MD 10:55 AM    MDM  Number of Diagnoses or Management Options  Critical Care  Total time providing critical care: 30-74 minutes (Total critical care time spent exclusive of procedures:  40 minutes.  )       26 y.o. female presents with concern for pneumonia from urgent care. Arrives with tachycardia, tachypnea and very uncomfortable. With bilateral chest discomfort. She developed some small volume hemoptysis as well. Her CXR and CT wo contrast are not convincing for pneumonia and she is afebrile with normal WBC count. Her dimer is elevated, she has recent travel history and is on OCP. She is being treated empirically with heparin for suspected PE given her hemodynamic profile but was covered and fluid resuscitated for sepsis d/t CAP. Pending V/Q for further risk stratification for PE but pretest probability is high. Unable to undergo CTA because of BROCK. Procedures  ED EKG interpretation:  Rhythm: sinus tachycardia; Rate (approx.): 140; ST/T wave: normal;   Note written by Elsie Tamez, as dictated by Kennedy Nieto MD 11:06 AM    PROGRESS NOTE:  11:07 AM  POC lactate 7.31. Code sepsis called. PROGRESS NOTE:  11:32 AM  Wbc is 8. CXR reviewed; clear. Ordered anti-coagulation for possible PE. PROGRESS NOTE:  12:48 PM  CT negative for PNA. PROGRESS NOTE:  1:21 PM  D-dimer 12.53    Hospitalist Diego for Admission  1:22 PM    ED Room Number: ER25/25  Patient Name and age:  Jerica Machado 26 y.o.  female  Working Diagnosis:   1. Acute pulmonary embolism without acute cor pulmonale, unspecified pulmonary embolism type ([facility] [address]    2. BROCK (acute kidney injury) ([facility] [address]    3. Severe dehydration    4.  Septic shock ([facility] [address]      Readmission: no  Isolation Requirements: no  Recommended Level of Care:  ICU  Code Status:  Full

## 2019-07-02 NOTE — ED TRIAGE NOTES
Patient presents from home with complaints of SOB and chest pain that  started on Sunday. Patient reports flu like symptoms that started on Wednesday and progressed. Patient was diagnosed with bronchitis on Saturday at [facility] and given antibiotics.   Patient went back to [facility] today and was referred here

## 2019-07-03 ENCOUNTER — APPOINTMENT (OUTPATIENT)
Dept: NON INVASIVE DIAGNOSTICS | Age: 34
DRG: 871 | End: 2019-07-03
Attending: FAMILY MEDICINE
Payer: COMMERCIAL

## 2019-07-03 LAB
ANION GAP SERPL CALC-SCNC: 8 MMOL/L (ref 5–15)
BACTERIA SPEC CULT: NORMAL
BACTERIA SPEC CULT: NORMAL
BASOPHILS # BLD: 0 K/UL (ref 0–0.1)
BASOPHILS NFR BLD: 0 % (ref 0–1)
BUN SERPL-MCNC: 10 MG/DL (ref 6–20)
BUN/CREAT SERPL: 12 (ref 12–20)
CALCIUM SERPL-MCNC: 8.2 MG/DL (ref 8.5–10.1)
CHLORIDE SERPL-SCNC: 104 MMOL/L (ref 97–108)
CK SERPL-CCNC: 38 U/L (ref 26–192)
CO2 SERPL-SCNC: 25 MMOL/L (ref 21–32)
CREAT SERPL-MCNC: 0.86 MG/DL (ref 0.55–1.02)
DIFFERENTIAL METHOD BLD: ABNORMAL
ECHO AO ROOT DIAM: 3.25 CM
ECHO AV CUSP MM: 1.79 CM
ECHO AV PEAK GRADIENT: 8 MMHG
ECHO AV PEAK VELOCITY: 141.78 CM/S
ECHO LA MAJOR AXIS: 3.42 CM
ECHO LA TO AORTIC ROOT RATIO: 1.05
ECHO LV E' LATERAL VELOCITY: 11.93 CM/S
ECHO LV E' SEPTAL VELOCITY: 9.86 CM/S
ECHO LV INTERNAL DIMENSION DIASTOLIC: 4.38 CM (ref 3.9–5.3)
ECHO LV INTERNAL DIMENSION SYSTOLIC: 3.13 CM
ECHO LV IVSD: 0.91 CM (ref 0.6–0.9)
ECHO LV MASS 2D: 139.5 G (ref 67–162)
ECHO LV MASS INDEX 2D: 73.9 G/M2 (ref 43–95)
ECHO LV POSTERIOR WALL DIASTOLIC: 0.85 CM (ref 0.6–0.9)
ECHO LVOT PEAK GRADIENT: 3.6 MMHG
ECHO LVOT PEAK VELOCITY: 95.17 CM/S
ECHO MV A VELOCITY: 73.72 CM/S
ECHO MV AREA PHT: 5 CM2
ECHO MV E DECELERATION TIME (DT): 152.2 MS
ECHO MV E VELOCITY: 85.34 CM/S
ECHO MV E/A RATIO: 1.16
ECHO MV E/E' LATERAL: 7.15
ECHO MV E/E' RATIO (AVERAGED): 7.9
ECHO MV E/E' SEPTAL: 8.66
ECHO MV PRESSURE HALF TIME (PHT): 44.1 MS
ECHO PV MAX VELOCITY: 106.95 CM/S
ECHO PV PEAK GRADIENT: 4.6 MMHG
ECHO RV TAPSE: 2.39 CM (ref 1.5–2)
ECHO TV REGURGITANT MAX VELOCITY: 283.58 CM/S
ECHO TV REGURGITANT PEAK GRADIENT: 32.2 MMHG
EOSINOPHIL # BLD: 0 K/UL (ref 0–0.4)
EOSINOPHIL NFR BLD: 0 % (ref 0–7)
ERYTHROCYTE [DISTWIDTH] IN BLOOD BY AUTOMATED COUNT: 12.8 % (ref 11.5–14.5)
GLUCOSE SERPL-MCNC: 166 MG/DL (ref 65–100)
HCT VFR BLD AUTO: 29 % (ref 35–47)
HGB BLD-MCNC: 10 G/DL (ref 11.5–16)
IMM GRANULOCYTES # BLD AUTO: 0 K/UL
IMM GRANULOCYTES NFR BLD AUTO: 0 %
LYMPHOCYTES # BLD: 0.9 K/UL (ref 0.8–3.5)
LYMPHOCYTES NFR BLD: 5 % (ref 12–49)
MCH RBC QN AUTO: 30.4 PG (ref 26–34)
MCHC RBC AUTO-ENTMCNC: 34.5 G/DL (ref 30–36.5)
MCV RBC AUTO: 88.1 FL (ref 80–99)
MONOCYTES # BLD: 0 K/UL (ref 0–1)
MONOCYTES NFR BLD: 0 % (ref 5–13)
NEUTS BAND NFR BLD MANUAL: 4 % (ref 0–6)
NEUTS SEG # BLD: 16.7 K/UL (ref 1.8–8)
NEUTS SEG NFR BLD: 91 % (ref 32–75)
NRBC # BLD: 0 K/UL (ref 0–0.01)
NRBC BLD-RTO: 0 PER 100 WBC
PLATELET # BLD AUTO: 206 K/UL (ref 150–400)
PMV BLD AUTO: 10.5 FL (ref 8.9–12.9)
POTASSIUM SERPL-SCNC: 3.2 MMOL/L (ref 3.5–5.1)
RBC # BLD AUTO: 3.29 M/UL (ref 3.8–5.2)
RBC MORPH BLD: ABNORMAL
SERVICE CMNT-IMP: NORMAL
SODIUM SERPL-SCNC: 137 MMOL/L (ref 136–145)
WBC # BLD AUTO: 17.6 K/UL (ref 3.6–11)

## 2019-07-03 PROCEDURE — 82550 ASSAY OF CK (CPK): CPT

## 2019-07-03 PROCEDURE — 80048 BASIC METABOLIC PNL TOTAL CA: CPT

## 2019-07-03 PROCEDURE — 65660000000 HC RM CCU STEPDOWN

## 2019-07-03 PROCEDURE — 74011250636 HC RX REV CODE- 250/636: Performed by: INTERNAL MEDICINE

## 2019-07-03 PROCEDURE — 74011250636 HC RX REV CODE- 250/636: Performed by: FAMILY MEDICINE

## 2019-07-03 PROCEDURE — 74011250637 HC RX REV CODE- 250/637: Performed by: FAMILY MEDICINE

## 2019-07-03 PROCEDURE — 74011000258 HC RX REV CODE- 258: Performed by: FAMILY MEDICINE

## 2019-07-03 PROCEDURE — 74011000258 HC RX REV CODE- 258: Performed by: INTERNAL MEDICINE

## 2019-07-03 PROCEDURE — 85025 COMPLETE CBC W/AUTO DIFF WBC: CPT

## 2019-07-03 PROCEDURE — 93306 TTE W/DOPPLER COMPLETE: CPT

## 2019-07-03 PROCEDURE — 74011636637 HC RX REV CODE- 636/637: Performed by: FAMILY MEDICINE

## 2019-07-03 PROCEDURE — 36415 COLL VENOUS BLD VENIPUNCTURE: CPT

## 2019-07-03 RX ORDER — IPRATROPIUM BROMIDE AND ALBUTEROL SULFATE 2.5; .5 MG/3ML; MG/3ML
3 SOLUTION RESPIRATORY (INHALATION)
Status: DISCONTINUED | OUTPATIENT
Start: 2019-07-03 | End: 2019-07-03

## 2019-07-03 RX ORDER — LANOLIN ALCOHOL/MO/W.PET/CERES
400 CREAM (GRAM) TOPICAL DAILY
Status: DISCONTINUED | OUTPATIENT
Start: 2019-07-03 | End: 2019-07-05 | Stop reason: HOSPADM

## 2019-07-03 RX ORDER — POTASSIUM CHLORIDE 750 MG/1
40 TABLET, FILM COATED, EXTENDED RELEASE ORAL
Status: COMPLETED | OUTPATIENT
Start: 2019-07-03 | End: 2019-07-03

## 2019-07-03 RX ORDER — IPRATROPIUM BROMIDE AND ALBUTEROL SULFATE 2.5; .5 MG/3ML; MG/3ML
3 SOLUTION RESPIRATORY (INHALATION)
Status: DISCONTINUED | OUTPATIENT
Start: 2019-07-03 | End: 2019-07-05 | Stop reason: HOSPADM

## 2019-07-03 RX ORDER — PREDNISONE 20 MG/1
20 TABLET ORAL 2 TIMES DAILY WITH MEALS
Status: DISCONTINUED | OUTPATIENT
Start: 2019-07-03 | End: 2019-07-05 | Stop reason: HOSPADM

## 2019-07-03 RX ORDER — CODEINE PHOSPHATE AND GUAIFENESIN 10; 100 MG/5ML; MG/5ML
5 SOLUTION ORAL
Status: DISCONTINUED | OUTPATIENT
Start: 2019-07-03 | End: 2019-07-05 | Stop reason: HOSPADM

## 2019-07-03 RX ORDER — FAMOTIDINE 20 MG/1
20 TABLET, FILM COATED ORAL 2 TIMES DAILY
Status: DISCONTINUED | OUTPATIENT
Start: 2019-07-03 | End: 2019-07-05 | Stop reason: HOSPADM

## 2019-07-03 RX ORDER — MAG HYDROX/ALUMINUM HYD/SIMETH 200-200-20
30 SUSPENSION, ORAL (FINAL DOSE FORM) ORAL
Status: DISCONTINUED | OUTPATIENT
Start: 2019-07-03 | End: 2019-07-05 | Stop reason: HOSPADM

## 2019-07-03 RX ADMIN — BENZONATATE 100 MG: 100 CAPSULE ORAL at 04:14

## 2019-07-03 RX ADMIN — SODIUM CHLORIDE, SODIUM LACTATE, POTASSIUM CHLORIDE, AND CALCIUM CHLORIDE 75 ML/HR: 600; 310; 30; 20 INJECTION, SOLUTION INTRAVENOUS at 20:37

## 2019-07-03 RX ADMIN — ACETAMINOPHEN 650 MG: 325 TABLET ORAL at 22:42

## 2019-07-03 RX ADMIN — VANCOMYCIN HYDROCHLORIDE 1000 MG: 1 INJECTION, POWDER, LYOPHILIZED, FOR SOLUTION INTRAVENOUS at 08:51

## 2019-07-03 RX ADMIN — POTASSIUM CHLORIDE 40 MEQ: 750 TABLET, FILM COATED, EXTENDED RELEASE ORAL at 12:02

## 2019-07-03 RX ADMIN — MAGNESIUM OXIDE TAB 400 MG (241.3 MG ELEMENTAL MG) 400 MG: 400 (241.3 MG) TAB at 12:02

## 2019-07-03 RX ADMIN — FAMOTIDINE 20 MG: 20 TABLET ORAL at 09:46

## 2019-07-03 RX ADMIN — DOXYCYCLINE 100 MG: 100 INJECTION, POWDER, LYOPHILIZED, FOR SOLUTION INTRAVENOUS at 14:02

## 2019-07-03 RX ADMIN — Medication 10 ML: at 06:23

## 2019-07-03 RX ADMIN — PREDNISONE 20 MG: 20 TABLET ORAL at 17:49

## 2019-07-03 RX ADMIN — DOXYCYCLINE 100 MG: 100 INJECTION, POWDER, LYOPHILIZED, FOR SOLUTION INTRAVENOUS at 02:17

## 2019-07-03 RX ADMIN — CEFEPIME HYDROCHLORIDE 2 G: 2 INJECTION, POWDER, FOR SOLUTION INTRAVENOUS at 20:33

## 2019-07-03 RX ADMIN — ALUMINUM HYDROXIDE, MAGNESIUM HYDROXIDE, AND SIMETHICONE 30 ML: 200; 200; 20 SUSPENSION ORAL at 15:32

## 2019-07-03 RX ADMIN — ACETAMINOPHEN 650 MG: 325 TABLET ORAL at 04:16

## 2019-07-03 RX ADMIN — SODIUM CHLORIDE, SODIUM LACTATE, POTASSIUM CHLORIDE, AND CALCIUM CHLORIDE 100 ML/HR: 600; 310; 30; 20 INJECTION, SOLUTION INTRAVENOUS at 04:40

## 2019-07-03 RX ADMIN — Medication 10 ML: at 15:33

## 2019-07-03 RX ADMIN — CEFEPIME HYDROCHLORIDE 2 G: 2 INJECTION, POWDER, FOR SOLUTION INTRAVENOUS at 12:01

## 2019-07-03 RX ADMIN — METHYLPREDNISOLONE SODIUM SUCCINATE 60 MG: 40 INJECTION, POWDER, FOR SOLUTION INTRAMUSCULAR; INTRAVENOUS at 06:23

## 2019-07-03 RX ADMIN — PREDNISONE 20 MG: 20 TABLET ORAL at 12:02

## 2019-07-03 RX ADMIN — VANCOMYCIN HYDROCHLORIDE 1000 MG: 1 INJECTION, POWDER, LYOPHILIZED, FOR SOLUTION INTRAVENOUS at 17:49

## 2019-07-03 RX ADMIN — FAMOTIDINE 20 MG: 20 TABLET ORAL at 17:49

## 2019-07-03 RX ADMIN — Medication 20 ML: at 00:52

## 2019-07-03 RX ADMIN — ACETAMINOPHEN 650 MG: 325 TABLET ORAL at 12:02

## 2019-07-03 NOTE — PROGRESS NOTES
Hospitalist Progress Note          Marcial Magana MD  Please call  and page for questions. Call physician on-call through the  7pm-7am    Daily Progress Note: 7/3/2019    Primary care provider:None    Date of admission: 7/2/2019 10:46 AM    Admission summery and hospital course:  38-year-old female with past medical history of hypercholesteremia and back pain, currently on OCPs, who presents to the hospital with shortness of breath. Reports that she started having flu-like symptoms about 9 days back. The patient reports that she had a cough associated with mild productive sputum which intermittently had \"specs of blood in it. \"  She took some Advil, but that did not seem to help, got concerned and went to [facility].  The patient reports meanwhile she had poor appetite, trouble eating and drinking, fatigue, had a fever of around 101, chills and chest pain. The patient reports that last Saturday, she went to [facility], it was thought that she has bronchitis, was given Zithromax, started taking her medication, but symptoms persisted. The patient reports that she went to [facility] again. They were concerned that the chest x-ray shows pneumonia, and she was sent to the ER for further management and evaluation. The patient currently denies any headache, blurry vision, sore throat, trouble swallowing, trouble with speech, any abdominal pain, constipation, diarrhea, urinary symptoms, focal or generalized neurological weakness, recent travel besides to [place], any falls, injuries, hematemesis, melena, hemoptysis, hematuria or any other concerns or problems. Subjective:   Patient complaints of indigestion. Patient said her breathing is improving. Assessment/Plan:   Shortness of breath with possible early sepsis:    ? Etiology. Probably CAP. V/Q scan with low probability of pulmonarymbolism.  Continue IV hydration, broad-spectrum antibiotics, DuoNebs, pulse oximetry monitoring, supportive care and continue to monitor. Influenza A and B negative, follow S pneu antigen. Pulmonary and ID consult appreciated. Follow TTE report. Codeine PRN for cough. Acute renal failure:    Likely prerenal. Corrected with IVF. Continue IVF for today at slower rate. Lactic acidosis: Resolved with IVF and antibiotic. Hyponatremia: Corrected with IVF. Hypokalemia: Will replace. Will add magnesium. Indigestion: Probably steroid related. Will start with Pepcid and simethicone. Decrease prednisone. Leukocytosis: Probably related to steroid. Will monitor with decreased prednisone. Patient may not not need steroid for long. See orders for other plans. VTE prophylaxis:  SCD and mobilization. Code status: Full  Discussed plan of care with Patient/Family and Nurse. Patient boy friend is at the bedside. Pre-admission lived at home. Discharge planning: Possibly home in 1 or 2 days. Review of Systems:     Review of Systems:  Symptom  Y/N  Comments   Symptom  Y/N  Comments    Fever/Chills  n    Chest Pain  n    Poor Appetite  n    Edema  n     Cough  y   Abdominal Pain  n     Sputum  n   Joint Pain      SOB/CAMPBELL  n   Pruritis/Rash      Nausea/vomit  n   Tolerating PT/OT      Diarrhea  n   Tolerating Diet      Constipation     Other      Could not obtain due to:         Objective:   Physical Exam:     Visit Vitals  /77 (BP 1 Location: Left arm, BP Patient Position: At rest)   Pulse 83   Temp 97.8 °F (36.6 °C)   Resp 18   Ht 5' 4\" (1.626 m)   Wt 83.9 kg (184 lb 15.5 oz)   SpO2 93%   BMI 31.75 kg/m²      O2 Device: Room air    Temp (24hrs), Av.3 °F (36.8 °C), Min:96.1 °F (35.6 °C), Max:100.4 °F (38 °C)    No intake/output data recorded.  1901 -  0700  In: 18591 [P.O.:720; I.V.:9670]  Out: 2100 [Urine:2100]      General:  Alert, cooperative, no distress, appears stated age. Lungs:   Clear to auscultation bilaterally.    Heart:  Regular rate and rhythm, S1, S2 normal, no murmur. Abdomen:   Soft, non-tender. Bowel sounds normal.    Extremities: Extremities normal, atraumatic, no cyanosis or edema. Pulses: 2+ and symmetric all extremities. Skin: Skin color, texture, turgor normal. No rashes or lesions   Neurologic: CNII-XII intact. Data Review:       Recent Days:  Recent Labs     07/03/19  0437 07/02/19  1104   WBC 17.6* 8.8   HGB 10.0* 12.9   HCT 29.0* 38.1    250     Recent Labs     07/03/19  0437 07/02/19  1104    130*   K 3.2* 4.0    93*   CO2 25 22   * 103*   BUN 10 19   CREA 0.86 2.86*   CA 8.2* 9.4   MG  --  2.4   ALB  --  3.2*   SGOT  --  53*   ALT  --  22     No results for input(s): PH, PCO2, PO2, HCO3, FIO2 in the last 72 hours. 24 Hour Results:  Recent Results (from the past 24 hour(s))   EKG, 12 LEAD, INITIAL    Collection Time: 07/02/19 11:03 AM   Result Value Ref Range    Ventricular Rate 140 BPM    Atrial Rate 140 BPM    P-R Interval 122 ms    QRS Duration 82 ms    Q-T Interval 304 ms    QTC Calculation (Bezet) 464 ms    Calculated P Axis 43 degrees    Calculated R Axis 3 degrees    Calculated T Axis 20 degrees    Diagnosis       Sinus tachycardia  No previous ECGs available  Confirmed by Rebeca Daniels M.D., Chloé Cabrera ([id number]) on 7/2/2019 1:41:21 PM     CBC WITH AUTOMATED DIFF    Collection Time: 07/02/19 11:04 AM   Result Value Ref Range    WBC 8.8 3.6 - 11.0 K/uL    RBC 4.28 3.80 - 5.20 M/uL    HGB 12.9 11.5 - 16.0 g/dL    HCT 38.1 35.0 - 47.0 %    MCV 89.0 80.0 - 99.0 FL    MCH 30.1 26.0 - 34.0 PG    MCHC 33.9 30.0 - 36.5 g/dL    RDW 13.3 11.5 - 14.5 %    PLATELET 304 894 - 777 K/uL    MPV 12.3 8.9 - 12.9 FL    NRBC 0.0 0  WBC    ABSOLUTE NRBC 0.00 0.00 - 0.01 K/uL    NEUTROPHILS 91 (H) 32 - 75 %    BAND NEUTROPHILS 3 0 - 6 %    LYMPHOCYTES 4 (L) 12 - 49 %    MONOCYTES 1 (L) 5 - 13 %    EOSINOPHILS 0 0 - 7 %    BASOPHILS 0 0 - 1 %    MYELOCYTES 1 (H) 0 %    IMMATURE GRANULOCYTES 0 %    ABS. NEUTROPHILS 8.3 (H) 1.8 - 8.0 K/UL    ABS. LYMPHOCYTES 0.4 (L) 0.8 - 3.5 K/UL    ABS. MONOCYTES 0.1 0.0 - 1.0 K/UL    ABS. EOSINOPHILS 0.0 0.0 - 0.4 K/UL    ABS. BASOPHILS 0.0 0.0 - 0.1 K/UL    ABS. IMM. GRANS. 0.0 K/UL    DF MANUAL      RBC COMMENTS NATALIIA CELLS  PRESENT        WBC COMMENTS TOXIC GRANULATION     METABOLIC PANEL, COMPREHENSIVE    Collection Time: 07/02/19 11:04 AM   Result Value Ref Range    Sodium 130 (L) 136 - 145 mmol/L    Potassium 4.0 3.5 - 5.1 mmol/L    Chloride 93 (L) 97 - 108 mmol/L    CO2 22 21 - 32 mmol/L    Anion gap 15 5 - 15 mmol/L    Glucose 103 (H) 65 - 100 mg/dL    BUN 19 6 - 20 MG/DL    Creatinine 2.86 (H) 0.55 - 1.02 MG/DL    BUN/Creatinine ratio 7 (L) 12 - 20      GFR est AA 23 (L) >60 ml/min/1.73m2    GFR est non-AA 19 (L) >60 ml/min/1.73m2    Calcium 9.4 8.5 - 10.1 MG/DL    Bilirubin, total 1.6 (H) 0.2 - 1.0 MG/DL    ALT (SGPT) 22 12 - 78 U/L    AST (SGOT) 53 (H) 15 - 37 U/L    Alk. phosphatase 130 (H) 45 - 117 U/L    Protein, total 9.4 (H) 6.4 - 8.2 g/dL    Albumin 3.2 (L) 3.5 - 5.0 g/dL    Globulin 6.2 (H) 2.0 - 4.0 g/dL    A-G Ratio 0.5 (L) 1.1 - 2.2     CULTURE, BLOOD, PAIRED    Collection Time: 07/02/19 11:04 AM   Result Value Ref Range    Special Requests: NO SPECIAL REQUESTS      Culture result: NO GROWTH AFTER 18 HOURS     SAMPLES BEING HELD    Collection Time: 07/02/19 11:04 AM   Result Value Ref Range    SAMPLES BEING HELD 1RD,1BLU     COMMENT        Add-on orders for these samples will be processed based on acceptable specimen integrity and analyte stability, which may vary by analyte.    MAGNESIUM    Collection Time: 07/02/19 11:04 AM   Result Value Ref Range    Magnesium 2.4 1.6 - 2.4 mg/dL   TROPONIN I    Collection Time: 07/02/19 11:04 AM   Result Value Ref Range    Troponin-I, Qt. <0.05 <0.05 ng/mL   NT-PRO BNP    Collection Time: 07/02/19 11:04 AM   Result Value Ref Range    NT pro- (H) <125 PG/ML   BETA HCG, QT    Collection Time: 07/02/19 11:04 AM   Result Value Ref Range    Beta HCG, QT <1 0 - 6 MIU/ML   CK    Collection Time: 07/02/19 11:04 AM   Result Value Ref Range     (H) 26 - 192 U/L   POC LACTIC ACID    Collection Time: 07/02/19 11:06 AM   Result Value Ref Range    Lactic Acid (POC) 7.31 (HH) 0.40 - 2.00 mmol/L   POC CHEM8    Collection Time: 07/02/19 11:27 AM   Result Value Ref Range    Calcium, ionized (POC) 1.00 (L) 1.12 - 1.32 mmol/L    Sodium (POC) 134 (L) 136 - 145 mmol/L    Potassium (POC) 3.1 (L) 3.5 - 5.1 mmol/L    Chloride (POC) 97 (L) 98 - 107 mmol/L    CO2 (POC) 19 (L) 21 - 32 mmol/L    Anion gap (POC) 22 (H) 10 - 20 mmol/L    Glucose (POC) 113 (H) 65 - 100 mg/dL    BUN (POC) 19 9 - 20 mg/dL    Creatinine (POC) 2.7 (H) 0.6 - 1.3 mg/dL    GFRAA, POC 24 (L) >60 ml/min/1.73m2    GFRNA, POC 20 (L) >60 ml/min/1.73m2    Hematocrit (POC) 33 (L) 35.0 - 47.0 %    Comment Comment Not Indicated.      POC G3 - PUL    Collection Time: 07/02/19 11:44 AM   Result Value Ref Range    pH (POC) 7.419 7.35 - 7.45      pCO2 (POC) 28.0 (L) 35.0 - 45.0 MMHG    pO2 (POC) 88 80 - 100 MMHG    HCO3 (POC) 18.1 (L) 22 - 26 MMOL/L    sO2 (POC) 97 92 - 97 %    Base deficit (POC) 6 mmol/L    Site RIGHT RADIAL      Device: NASAL CANNULA      Flow rate (POC) 2.0 L/M    Allens test (POC) NO      Specimen type (POC) ARTERIAL     PTT    Collection Time: 07/02/19 12:25 PM   Result Value Ref Range    aPTT 28.6 22.1 - 32.0 sec    aPTT, therapeutic range     58.0 - 77.0 SECS   D DIMER    Collection Time: 07/02/19 12:25 PM   Result Value Ref Range    D-dimer 12.53 (H) 0.00 - 0.65 mg/L FEU   LACTIC ACID    Collection Time: 07/02/19  1:37 PM   Result Value Ref Range    Lactic acid 5.5 (HH) 0.4 - 2.0 MMOL/L   URINALYSIS W/MICROSCOPIC    Collection Time: 07/02/19  4:53 PM   Result Value Ref Range    Color DARK YELLOW      Appearance CLOUDY (A) CLEAR      Specific gravity 1.014 1.003 - 1.030      pH (UA) 5.5 5.0 - 8.0      Protein 30 (A) NEG mg/dL    Glucose NEGATIVE  NEG mg/dL    Ketone TRACE (A) NEG mg/dL    Blood LARGE (A) NEG      Urobilinogen 2.0 (H) 0.2 - 1.0 EU/dL    Nitrites NEGATIVE  NEG      Leukocyte Esterase NEGATIVE  NEG      WBC 0-4 0 - 4 /hpf    RBC 5-10 0 - 5 /hpf    Epithelial cells MODERATE (A) FEW /lpf    Bacteria NEGATIVE  NEG /hpf   URINE CULTURE HOLD SAMPLE    Collection Time: 07/02/19  4:53 PM   Result Value Ref Range    Urine culture hold        URINE ON HOLD IN MICROBIOLOGY DEPT FOR 3 DAYS. IF UNPRESERVED URINE IS SUBMITTED, IT CANNOT BE USED FOR ADDITIONAL TESTING AFTER 24 HRS, RECOLLECTION WILL BE REQUIRED.    BILIRUBIN, CONFIRM    Collection Time: 07/02/19  4:53 PM   Result Value Ref Range    Bilirubin UA, confirm POSITIVE (A) NEG     INFLUENZA A & B AG (RAPID TEST)    Collection Time: 07/02/19  4:54 PM   Result Value Ref Range    Influenza A Antigen NEGATIVE  NEG      Influenza B Antigen NEGATIVE  NEG     CULTURE, MRSA    Collection Time: 07/02/19  4:54 PM   Result Value Ref Range    Special Requests: NO SPECIAL REQUESTS      Culture result: MRSA NOT PRESENT AT 13 HOURS     TROPONIN I    Collection Time: 07/02/19  7:41 PM   Result Value Ref Range    Troponin-I, Qt. <0.05 <0.05 ng/mL   LACTIC ACID    Collection Time: 07/02/19  7:41 PM   Result Value Ref Range    Lactic acid 1.3 0.4 - 2.0 MMOL/L   LIPASE    Collection Time: 07/02/19  7:45 PM   Result Value Ref Range    Lipase 139 73 - 393 U/L   RESPIRATORY PANEL,PCR,NASOPHARYNGEAL    Collection Time: 07/02/19  8:57 PM   Result Value Ref Range    Adenovirus NOT DETECTED NOTD      Coronavirus 229E NOT DETECTED NOTD      Coronavirus HKU1 NOT DETECTED NOTD      Coronavirus CVNL63 NOT DETECTED NOTD      Coronavirus OC43 NOT DETECTED NOTD      Metapneumovirus NOT DETECTED NOTD      Rhinovirus and Enterovirus NOT DETECTED NOTD      Influenza A NOT DETECTED NOTD      Influenza A, subtype H1 NOT DETECTED NOTD      Influenza A, subtype H3 NOT DETECTED NOTD      INFLUENZA A H1N1 PCR NOT DETECTED NOTD      Influenza B NOT DETECTED NOTD Parainfluenza 1 NOT DETECTED NOTD      Parainfluenza 2 NOT DETECTED NOTD      Parainfluenza 3 NOT DETECTED NOTD      Parainfluenza virus 4 NOT DETECTED NOTD      RSV by PCR NOT DETECTED NOTD      Bordetella pertussis - PCR NOT DETECTED NOTD      Chlamydophila pneumoniae DNA, QL, PCR NOT DETECTED NOTD      Mycoplasma pneumoniae DNA, QL, PCR NOT DETECTED NOTD     CBC WITH AUTOMATED DIFF    Collection Time: 07/03/19  4:37 AM   Result Value Ref Range    WBC 17.6 (H) 3.6 - 11.0 K/uL    RBC 3.29 (L) 3.80 - 5.20 M/uL    HGB 10.0 (L) 11.5 - 16.0 g/dL    HCT 29.0 (L) 35.0 - 47.0 %    MCV 88.1 80.0 - 99.0 FL    MCH 30.4 26.0 - 34.0 PG    MCHC 34.5 30.0 - 36.5 g/dL    RDW 12.8 11.5 - 14.5 %    PLATELET 764 670 - 291 K/uL    MPV 10.5 8.9 - 12.9 FL    NRBC 0.0 0  WBC    ABSOLUTE NRBC 0.00 0.00 - 0.01 K/uL    NEUTROPHILS 91 (H) 32 - 75 %    BAND NEUTROPHILS 4 0 - 6 %    LYMPHOCYTES 5 (L) 12 - 49 %    MONOCYTES 0 (L) 5 - 13 %    EOSINOPHILS 0 0 - 7 %    BASOPHILS 0 0 - 1 %    IMMATURE GRANULOCYTES 0 %    ABS. NEUTROPHILS 16.7 (H) 1.8 - 8.0 K/UL    ABS. LYMPHOCYTES 0.9 0.8 - 3.5 K/UL    ABS. MONOCYTES 0.0 0.0 - 1.0 K/UL    ABS. EOSINOPHILS 0.0 0.0 - 0.4 K/UL    ABS. BASOPHILS 0.0 0.0 - 0.1 K/UL    ABS. IMM.  GRANS. 0.0 K/UL    DF MANUAL      RBC COMMENTS NORMOCYTIC, NORMOCHROMIC     CK    Collection Time: 07/03/19  4:37 AM   Result Value Ref Range    CK 38 26 - 530 U/L   METABOLIC PANEL, BASIC    Collection Time: 07/03/19  4:37 AM   Result Value Ref Range    Sodium 137 136 - 145 mmol/L    Potassium 3.2 (L) 3.5 - 5.1 mmol/L    Chloride 104 97 - 108 mmol/L    CO2 25 21 - 32 mmol/L    Anion gap 8 5 - 15 mmol/L    Glucose 166 (H) 65 - 100 mg/dL    BUN 10 6 - 20 MG/DL    Creatinine 0.86 0.55 - 1.02 MG/DL    BUN/Creatinine ratio 12 12 - 20      GFR est AA >60 >60 ml/min/1.73m2    GFR est non-AA >60 >60 ml/min/1.73m2    Calcium 8.2 (L) 8.5 - 10.1 MG/DL       Problem List:  Problem List as of 7/3/2019 Date Reviewed: 6/4/2013 Codes Class Noted - Resolved    Pulmonary emboli ([facility] [address] ICD-10-CM: I26.99  ICD-9-CM: 415.19  7/2/2019 - Present        SOB (shortness of breath) ICD-10-CM: R06.02  ICD-9-CM: 786.05  7/2/2019 - Present        Hypercholesterolemia ICD-10-CM: E78.00  ICD-9-CM: 272.0  7/23/2012 - Present              Medications reviewed  Current Facility-Administered Medications   Medication Dose Route Frequency    vancomycin (VANCOCIN) 1,000 mg in 0.9% sodium chloride (MBP/ADV) 250 mL  1,000 mg IntraVENous Q8H    famotidine (PEPCID) tablet 20 mg  20 mg Oral BID    alum-mag hydroxide-simeth (MYLANTA) oral suspension 30 mL  30 mL Oral Q4H PRN    sodium chloride (NS) flush 5-10 mL  5-10 mL IntraVENous PRN    sodium chloride (NS) flush 5-40 mL  5-40 mL IntraVENous Q8H    sodium chloride (NS) flush 5-40 mL  5-40 mL IntraVENous PRN    acetaminophen (TYLENOL) tablet 650 mg  650 mg Oral Q4H PRN    albuterol-ipratropium (DUO-NEB) 2.5 MG-0.5 MG/3 ML  3 mL Nebulization Q4H RT    methylPREDNISolone (PF) (SOLU-MEDROL) injection 60 mg  60 mg IntraVENous Q8H    lactated Ringers infusion  100 mL/hr IntraVENous CONTINUOUS    benzonatate (TESSALON) capsule 100 mg  100 mg Oral TID PRN    doxycycline (VIBRAMYCIN) 100 mg in 0.9% sodium chloride (MBP/ADV) 100 mL  100 mg IntraVENous Q12H    Vancomycin - pharmacy to dose   Other Rx Dosing/Monitoring    cefepime (MAXIPIME) 2 g in 0.9% sodium chloride (MBP/ADV) 100 mL  2 g IntraVENous Q24H       Care Plan discussed with: Patient/Family and Nurse    Total time spent with patient: 30 minutes.     Elfego Galloway MD

## 2019-07-03 NOTE — PROGRESS NOTES
ID Progress Note  7/3/2019    Subjective:     She is notably better today. Still with cough and pleuritis    Objective:     Antibiotics:  1. Cefepime   2. Vancomycin   3. Doxycycline       Vitals:   Visit Vitals  /85 (BP 1 Location: Left arm, BP Patient Position: At rest)   Pulse 80   Temp 97.6 °F (36.4 °C)   Resp 18   Ht 5' 4\" (1.626 m)   Wt 83.5 kg (184 lb)   SpO2 95%   BMI 31.58 kg/m²        Tmax:  Temp (24hrs), Av.7 °F (36.5 °C), Min:96.1 °F (35.6 °C), Max:98.4 °F (36.9 °C)      Exam:  Lungs:  diminished breath sounds R base, L base  Heart:  regular rate and rhythm  Abdomen:  soft, non-tender. Bowel sounds normal. No masses,  no organomegaly  Skin:  no rash or abnormalities    Labs:      Recent Labs     19  0437 19  1104   WBC 17.6* 8.8   HGB 10.0* 12.9    250   BUN 10 19   CREA 0.86 2.86*   SGOT  --  53*   AP  --  130*   TBILI  --  1.6*       Cultures:     No results found for: SDES  Lab Results   Component Value Date/Time    Culture result: MRSA NOT PRESENT AT 17 HOURS 2019 04:54 PM    Culture result: NO GROWTH AFTER 18 HOURS 2019 11:04 AM       Radiology:     Line/Insert Date:           Assessment:     1. Pneumonia vs bronchitis vs ?  2. Chest imaging with a few nodules  3. No epi risks of infection that I can determine    Objective:     1. Continue current therapy  2. Follow up cultures and studies  3.  Discussed with patient    Arian Gaston MD

## 2019-07-03 NOTE — PROGRESS NOTES
0000: Report received from [place], [address] [facility]. Pt in bed, resting quietly, boyfriend at bedside, on RA, MIV fluids running at 100mL/h. Plan for nephro consult today, needs to be assigned. 0400: Pt up ad yair to restroom, voided. No assistance provided & did not visualize urine. 3488: Pt complaining of nagging cough, PRN tessalon given. Clear sputum w/ scant blood noted. 1538: Pt complaining of mild CP r/t coughing, PRN tylenol given. 0435: Labs not appearing in [id number] [place] [address], reordered. Labs sent. 0800: Bedside and Verbal shift change report given to Gloria Castellon RN. Report included the following information SBAR, Intake/Output, MAR, Recent Results and Cardiac Rhythm NSR.

## 2019-07-03 NOTE — PROGRESS NOTES
"[facility], operated by [facility] [address] [facility]  History & Physical    Subjective:      Chief Complaint/Reason for Admission: Here for declot    Gilson Owens is a 70 y.o. male with ESRD (TTS at [facility] with Dr. Jarquin) who presents today for declot procedure after the dialysis unit noted no flow in his fistula.  Of note, he says the ecchymoses on his arm are chronically there.  He has had reactions to seafood in the past ("closing of the throat").    Past Medical History:   Diagnosis Date    Anemia     Atrial fibrillation     C. difficile colitis     Cataract     Coronary artery disease     Diabetes mellitus     Encounter for blood transfusion     Hyperlipidemia     Hypertension     Kidney disease, chronic, end stage on dialysis     Kidney stones     Restless leg     Sleep apnea     Sleep apnea     Venous insufficiency      Past Surgical History:   Procedure Laterality Date    CATARACT EXTRACTION, BILATERAL      EYE SURGERY      KIDNEY STONE SURGERY      leg stents      x2     PERITONEAL CATHETER INSERTION      PERITONEAL CATHETER REMOVAL      REMOVAL, CATHETER, CENTRAL VENOUS, TUNNELED N/A 9/10/2018    Performed by Ahsan Mendoza MD at [facility] CATH LAB     Family History   Problem Relation Age of Onset    Hypertension Mother     Aneurysm Father     Cancer Sister     Breast cancer Sister     Throat cancer Sister     Brain cancer Sister     Hypertension Sister     Heart disease Sister     Arthritis Sister     Parkinsonism Brother     Emphysema Brother      Social History     Tobacco Use    Smoking status: Former Smoker     Packs/day: 0.50     Years: 10.00     Pack years: 5.00     Types: Cigarettes, Pipe, Cigars     Last attempt to quit: 1979     Years since quittin.0    Smokeless tobacco: Never Used   Substance Use Topics    Alcohol use: Yes     Alcohol/week: 1.2 oz     Types: 2 Glasses of wine per week     Comment: occasionally    Drug use: No       PTA Medications " Day #2 of Vancomycin  Indication:  CAP  Current regimen:  2G loading dose given. MD not initiated at consult secondary to initially significant BROCK. Abx regimen:  Vanc, Doxycycline, and Cefepime  ID Following ?: YES  Concomitant nephrotoxic drugs (requires more frequent monitoring): None  Frequency of BMP?: complete metabolic panel set for am of 2019    Recent Labs     19  0437 19  1104   WBC 17.6* 8.8   CREA 0.86 2.86*   BUN 10 19     Est CrCl: ~95 ml/min; UO: Unmeasured occurrences at this time. Temp (24hrs), Av.2 °F (36.8 °C), Min:96.1 °F (35.6 °C), Max:100.4 °F (38 °C)    Cultures:   Blood:  pending  Respiratory viral panel:  negative    Goal trough = 15 - 20 mcg/mL    Recent trough history (date/time/level/dose/action taken):  New start    Plan: Begin 1000mg IV q8 hours now that the patient's BROCK has resolved with hydration. First maintenance dose can be as soon as possible. Cefepime dosing adjusted as well.   Medication Sig    amiodarone (PACERONE) 200 MG Tab Take 200 mg by mouth 2 (two) times daily.    aspirin (ECOTRIN) 81 MG EC tablet Take 81 mg by mouth once daily.    coenzyme Q10 300 mg Cap Take 300 mg by mouth once daily.    furosemide (LASIX) 40 MG tablet Take 40 mg by mouth. 4 days a week.    glucosamine-chondroitin 500-400 mg tablet Take 2 tablets by mouth once daily.    hydroCHLOROthiazide (HYDRODIURIL) 25 MG tablet TAKE 1 TABLET BY MOUTH AS DIRECTED    insulin glargine (LANTUS) 100 unit/mL injection Inject 25 Units into the skin every evening.     paricalcitol (ZEMPLAR) 1 MCG capsule Take 1 mcg by mouth once daily.    rOPINIRole (REQUIP) 2 MG tablet Take 2 mg by mouth 2 (two) times daily.    sevelamer carbonate (RENVELA) 800 mg Tab Take 1,600 mg by mouth 4 (four) times daily.    valsartan (DIOVAN) 80 MG tablet Take 80 mg by mouth once daily.    omega-3 acid ethyl esters (LOVAZA) 1 gram capsule Take 1 g by mouth once daily.    rOPINIRole (REQUIP) 0.25 MG tablet Take 0.5 mg by mouth once daily.     Review of patient's allergies indicates:   Allergen Reactions    Shrimp         Review of Systems   Constitutional: Negative.    Respiratory: Negative.    Cardiovascular: Negative.        Objective:      Vital Signs (Most Recent)  Temp: 98.1 °F (36.7 °C) (02/18/19 1030)  Pulse: 64 (02/18/19 1030)  Resp: 16 (02/18/19 1030)  BP: (!) 108/54 (02/18/19 1030)  SpO2: 100 % (02/18/19 1030)    Vital Signs Range (Last 24H):  Temp:  [98.1 °F (36.7 °C)]   Pulse:  [64]   Resp:  [16]   BP: (108)/(54)   SpO2:  [100 %]     Physical Exam   Constitutional: He is oriented to person, place, and time. He appears well-developed and well-nourished. No distress.   HENT:   Head: Normocephalic and atraumatic.   Eyes: EOM are normal.   Neck: Neck supple.   Pulmonary/Chest: Effort normal. No respiratory distress.   Musculoskeletal:   RUE brachiocephalic AVF with good thrill, appropriate pulse augmentation, full collapse on arm  elevation.  Large ecchymosis.  On US, the vessel diameter is 5 mm at the anastomosis and JA region, and has good caliber through the fistula, with some small hematoma with no evidence of active extravasation from the fistula and no compression of the fistula.  The fistula runs deep towards the axilla.   Neurological: He is alert and oriented to person, place, and time.   Skin: Skin is warm and dry. He is not diaphoretic.   Psychiatric: He has a normal mood and affect. His behavior is normal.       Assessment:      Active Hospital Problems    Diagnosis  POA    ESRD (end stage renal disease) [N18.6]  Yes      Resolved Hospital Problems   No resolved problems to display.       Plan:      Fistulogram today.  Risks explained, consent signed.  We will give benadryl 25 mg IV prior to the administration of IV contrast.

## 2019-07-03 NOTE — PROGRESS NOTES
ADULT PROTOCOL: JET AEROSOL ASSESSMENT    Patient  Ava Kiran     26 y.o.   female     7/3/2019  11:35 AM    Breath Sounds Pre Procedure:  Clear                                    Breath Sounds Post Procedure:  Clear                                      Breathing pattern: Pre procedure Breathing Pattern: Regular          Post procedure Breathing Pattern: Regular    Heart Rate: Pre procedure Pulse: 90           Post procedure      Resp Rate: Pre procedure Respirations: 18           Post procedure         Oxygen: O2 Device: Room air        SpO2: Pre procedure SpO2: 97 %      Nebulizer Therapy: Current medications Aerosolized Medications: DuoNeb      Changed: YES; PRN. Pt refusing scheduled breathing treatments.  BBS-clear        Problem List:   Patient Active Problem List   Diagnosis Code    Hypercholesterolemia E78.00    Pulmonary emboli (HCC) I26.99    SOB (shortness of breath) R06.02       Respiratory Therapist: Hannah Newman RT

## 2019-07-03 NOTE — PROGRESS NOTES
Pulmonary, Critical Care, and Sleep Medicine~Progress Note    Name: Allyson Magallanes MRN: [id number]   : 1985 Hospital: [address]   Date: 7/3/2019 10:09 AM Admission: 2019     Impression Plan   1. CAP  2. Bilateral pulmonary nodules and pleural based; septic emboli? No reported IV drug use    3. Lactic acidosis from dehydration and sepsis   4. BROCK, better  1. ABX per ID  2. ECHO pending  3. O2 titration above 90%   4. duonebs for now   5. Prn tomorrow; we will check back on Friday      Daily Progression:    + cough. Some congestion; mostly clear     I have reviewed the labs and previous days notes. Pertinent items are noted in HPI. OBJECTIVE:     Vital Signs:       Visit Vitals  /77 (BP 1 Location: Left arm, BP Patient Position: At rest)   Pulse 83   Temp 97.8 °F (36.6 °C)   Resp 18   Ht 5' 4\" (1.626 m)   Wt 83.9 kg (184 lb 15.5 oz)   SpO2 93%   BMI 31.75 kg/m²      Temp (24hrs), Av.3 °F (36.8 °C), Min:96.1 °F (35.6 °C), Max:100.4 °F (38 °C)     Intake/Output:     Last shift: No intake/output data recorded. Last 3 shifts: 1901 -  0700  In: 02577 [P.O.:720;  I.V.:9670]  Out: 2100 [Urine:2100]          Intake/Output Summary (Last 24 hours) at 7/3/2019 1009  Last data filed at 7/3/2019 0700  Gross per 24 hour   Intake 99204 ml   Output 2100 ml   Net 8290 ml       Physical Exam:                                        Exam Findings Other   General: No resp distress noted, appears stated age    [de-identified]:  No ulcers, JVD not elevated, no cervical LAD    Chest: No pectus deformity, normal chest rise b/l    HEART:  RRR, no murmurs/rubs/gallops    Lungs:  CTA b/l, no rhonchi/crackles/wheeze, diminished BS at bases    ABD: Soft/NT, non rigid mildly distended    EXT: No cyanosis/clubbing/edema, normal peripheral pulses    Skin: No rashes or ulcers, no mottling    Neuro: A/O x 3        Medications:  Current Facility-Administered Medications   Medication Dose Route Frequency    vancomycin (VANCOCIN) 1,000 mg in 0.9% sodium chloride (MBP/ADV) 250 mL  1,000 mg IntraVENous Q8H    famotidine (PEPCID) tablet 20 mg  20 mg Oral BID    alum-mag hydroxide-simeth (MYLANTA) oral suspension 30 mL  30 mL Oral Q4H PRN    potassium chloride SR (KLOR-CON 10) tablet 40 mEq  40 mEq Oral NOW    magnesium oxide (MAG-OX) tablet 400 mg  400 mg Oral DAILY    predniSONE (DELTASONE) tablet 20 mg  20 mg Oral BID WITH MEALS    sodium chloride (NS) flush 5-10 mL  5-10 mL IntraVENous PRN    sodium chloride (NS) flush 5-40 mL  5-40 mL IntraVENous Q8H    sodium chloride (NS) flush 5-40 mL  5-40 mL IntraVENous PRN    acetaminophen (TYLENOL) tablet 650 mg  650 mg Oral Q4H PRN    albuterol-ipratropium (DUO-NEB) 2.5 MG-0.5 MG/3 ML  3 mL Nebulization Q4H RT    lactated Ringers infusion  75 mL/hr IntraVENous CONTINUOUS    benzonatate (TESSALON) capsule 100 mg  100 mg Oral TID PRN    doxycycline (VIBRAMYCIN) 100 mg in 0.9% sodium chloride (MBP/ADV) 100 mL  100 mg IntraVENous Q12H    Vancomycin - pharmacy to dose   Other Rx Dosing/Monitoring    cefepime (MAXIPIME) 2 g in 0.9% sodium chloride (MBP/ADV) 100 mL  2 g IntraVENous Q24H       Labs:  ABG Recent Labs     07/02/19  1144   PHI 7.419   PCO2I 28.0*   PO2I 88   HCO3I 18.1*   SO2I 97        CBC Recent Labs     07/03/19 0437 07/02/19  1104   WBC 17.6* 8.8   HGB 10.0* 12.9   HCT 29.0* 38.1    250   MCV 88.1 89.0   MCH 30.4 38.8        Metabolic  Panel Recent Labs     07/03/19 0437 07/02/19  1104    130*   K 3.2* 4.0    93*   CO2 25 22   * 103*   BUN 10 19   CREA 0.86 2.86*   CA 8.2* 9.4   MG  --  2.4   ALB  --  3.2*   SGOT  --  53*   ALT  --  22        Pertinent Labs                TALIB Johnson  7/3/2019

## 2019-07-03 NOTE — PROGRESS NOTES
Problem: Sepsis: Day of Diagnosis  Goal: Treatments/Interventions/Procedures  Outcome: Progressing Towards Goal     Problem: Sepsis: Day of Diagnosis  Goal: Treatments/Interventions/Procedures  Outcome: Progressing Towards Goal     Problem: Sepsis: Day of Diagnosis  Goal: Treatments/Interventions/Procedures  Outcome: Progressing Towards Goal     Problem: Sepsis: Day of Diagnosis  Goal: Medications  Outcome: Progressing Towards Goal

## 2019-07-03 NOTE — CONSULTS
Labs reviewed  BROCK resolved, Cr normal  No renal issues at this time  Discussed with Dr. Max Burciaga  Please call us back with any issues      Ziyad Phan MD  [facility]   [address] [address]  Phone - [phone]   Fax - [phone]  [email or web address]

## 2019-07-03 NOTE — PROGRESS NOTES
Reason for Admission:   Acute pulmonary embolism without acute cor pulmonale, unspecified pulmonary embolism type                    RRAT Score:      2               Plan for utilizing home health:     Not at this time                     Current Advanced Directive/Advance Care Plan:  Full code with no advance care plan on file                         Transition of Care Plan:         Anticipated plan is home with family assistance. CM will continue to follow and assist with transition of care needs. 26year old female. AOx4. Independent with ADL's and IADL's. No DME utilized. Recently layed off, currently receiving severance pay. No current financial stressors. [facility] located in [place] is pharmacy utilized for prescriptions. Family to transport. Patient does not have a PCP. Patient requesting to be provided with a list of providers for her to follow-up. CM provided list of providers.     Care Management Interventions  PCP Verified by CM: No  Palliative Care Criteria Met (RRAT>21 & CHF Dx)?: No  Mode of Transport at Discharge: BLS  Transition of Care Consult (CM Consult): (No CM consults or needs)  Discharge Durable Medical Equipment: No  Physical Therapy Consult: No  Occupational Therapy Consult: No  Speech Therapy Consult: No  Current Support Network: Own Home, Other  Confirm Follow Up Transport: Family  Plan discussed with Pt/Family/Caregiver: Yes  Discharge Location  Discharge Placement: Home with family assistance(TBD/subject to change pending recommendations)    ROBB Gonzalez/JAVIER  Care Management  3:02 PM

## 2019-07-03 NOTE — PROGRESS NOTES
Pharmacist Note - Vancomycin Dosing    Consult provided for this 26 y.o. female for indication of sepsis, possibly due to CAP. Antibiotic regimen(s): Vanc + Cefepime +Doxycycline    Recent Labs     19  1104   WBC 8.8   CREA 2.86*   BUN 19     Frequency of BMP: daily x 3  Height: 124.5 cm  Weight: 80.9 kg  Est CrCl: 22 ml/min; UO: -- ml/kg/hr  Temp (24hrs), Av °F (37.2 °C), Min:98.3 °F (36.8 °C), Max:100.4 °F (38 °C)    Cultures:   blood - pending    Goal trough = 15 - 20 mcg/mL    Therapy will be initiated with a loading dose of 2000 mg IV x 1. Will await AM labs to determine future dosing/levels due to BROCK. Pharmacy to follow patient daily and order levels / make dose adjustments as appropriate.

## 2019-07-04 LAB
ALBUMIN SERPL-MCNC: 2.3 G/DL (ref 3.5–5)
ALBUMIN/GLOB SERPL: 0.5 {RATIO} (ref 1.1–2.2)
ALP SERPL-CCNC: 88 U/L (ref 45–117)
ALT SERPL-CCNC: 17 U/L (ref 12–78)
ANION GAP SERPL CALC-SCNC: 9 MMOL/L (ref 5–15)
AST SERPL-CCNC: 22 U/L (ref 15–37)
BASOPHILS # BLD: 0 K/UL (ref 0–0.1)
BASOPHILS NFR BLD: 0 % (ref 0–1)
BILIRUB SERPL-MCNC: 0.5 MG/DL (ref 0.2–1)
BUN SERPL-MCNC: 13 MG/DL (ref 6–20)
BUN/CREAT SERPL: 21 (ref 12–20)
CALCIUM SERPL-MCNC: 7.8 MG/DL (ref 8.5–10.1)
CHLORIDE SERPL-SCNC: 104 MMOL/L (ref 97–108)
CK SERPL-CCNC: 38 U/L (ref 26–192)
CO2 SERPL-SCNC: 25 MMOL/L (ref 21–32)
CREAT SERPL-MCNC: 0.63 MG/DL (ref 0.55–1.02)
DATE LAST DOSE: ABNORMAL
DIFFERENTIAL METHOD BLD: ABNORMAL
EOSINOPHIL # BLD: 0 K/UL (ref 0–0.4)
EOSINOPHIL NFR BLD: 0 % (ref 0–7)
ERYTHROCYTE [DISTWIDTH] IN BLOOD BY AUTOMATED COUNT: 13.4 % (ref 11.5–14.5)
FLUID CULTURE, SPNG2: NORMAL
GLOBULIN SER CALC-MCNC: 4.4 G/DL (ref 2–4)
GLUCOSE SERPL-MCNC: 134 MG/DL (ref 65–100)
HCT VFR BLD AUTO: 27.8 % (ref 35–47)
HGB BLD-MCNC: 9.3 G/DL (ref 11.5–16)
IMM GRANULOCYTES # BLD AUTO: 0 K/UL
IMM GRANULOCYTES NFR BLD AUTO: 0 %
L PNEUMO1 AG UR QL IA: NEGATIVE
LYMPHOCYTES # BLD: 1.3 K/UL (ref 0.8–3.5)
LYMPHOCYTES NFR BLD: 7 % (ref 12–49)
MCH RBC QN AUTO: 30.2 PG (ref 26–34)
MCHC RBC AUTO-ENTMCNC: 33.5 G/DL (ref 30–36.5)
MCV RBC AUTO: 90.3 FL (ref 80–99)
MONOCYTES # BLD: 0.9 K/UL (ref 0–1)
MONOCYTES NFR BLD: 5 % (ref 5–13)
NEUTS BAND NFR BLD MANUAL: 2 % (ref 0–6)
NEUTS SEG # BLD: 16.7 K/UL (ref 1.8–8)
NEUTS SEG NFR BLD: 86 % (ref 32–75)
NRBC # BLD: 0 K/UL (ref 0–0.01)
NRBC BLD-RTO: 0 PER 100 WBC
ORGANISM ID, SPNG3: NORMAL
PLATELET # BLD AUTO: 247 K/UL (ref 150–400)
PLATELET COMMENTS,PCOM: ABNORMAL
PLEASE NOTE, SPNG4: NORMAL
PMV BLD AUTO: 10.4 FL (ref 8.9–12.9)
POTASSIUM SERPL-SCNC: 3.8 MMOL/L (ref 3.5–5.1)
PROT SERPL-MCNC: 6.7 G/DL (ref 6.4–8.2)
RBC # BLD AUTO: 3.08 M/UL (ref 3.8–5.2)
RBC MORPH BLD: ABNORMAL
RBC MORPH BLD: ABNORMAL
REPORTED DOSE,DOSE: ABNORMAL UNITS
REPORTED DOSE/TIME,TMG: ABNORMAL
S PNEUM AG SPEC QL LA: NEGATIVE
SODIUM SERPL-SCNC: 138 MMOL/L (ref 136–145)
SPECIMEN SOURCE: NORMAL
SPECIMEN SOURCE: NORMAL
SPECIMEN, SPNG1: NORMAL
VANCOMYCIN TROUGH SERPL-MCNC: 10.3 UG/ML (ref 5–10)
WBC # BLD AUTO: 18.9 K/UL (ref 3.6–11)

## 2019-07-04 PROCEDURE — 80202 ASSAY OF VANCOMYCIN: CPT

## 2019-07-04 PROCEDURE — 36415 COLL VENOUS BLD VENIPUNCTURE: CPT

## 2019-07-04 PROCEDURE — 74011250637 HC RX REV CODE- 250/637: Performed by: FAMILY MEDICINE

## 2019-07-04 PROCEDURE — 74011250636 HC RX REV CODE- 250/636: Performed by: FAMILY MEDICINE

## 2019-07-04 PROCEDURE — 74011000258 HC RX REV CODE- 258: Performed by: INTERNAL MEDICINE

## 2019-07-04 PROCEDURE — 74011636637 HC RX REV CODE- 636/637: Performed by: FAMILY MEDICINE

## 2019-07-04 PROCEDURE — 94760 N-INVAS EAR/PLS OXIMETRY 1: CPT

## 2019-07-04 PROCEDURE — 80053 COMPREHEN METABOLIC PANEL: CPT

## 2019-07-04 PROCEDURE — 74011000258 HC RX REV CODE- 258: Performed by: FAMILY MEDICINE

## 2019-07-04 PROCEDURE — 77010033678 HC OXYGEN DAILY

## 2019-07-04 PROCEDURE — 65660000000 HC RM CCU STEPDOWN

## 2019-07-04 PROCEDURE — 85025 COMPLETE CBC W/AUTO DIFF WBC: CPT

## 2019-07-04 PROCEDURE — 74011250636 HC RX REV CODE- 250/636: Performed by: INTERNAL MEDICINE

## 2019-07-04 RX ORDER — VANCOMYCIN HYDROCHLORIDE
1250 EVERY 8 HOURS
Status: DISCONTINUED | OUTPATIENT
Start: 2019-07-05 | End: 2019-07-05 | Stop reason: HOSPADM

## 2019-07-04 RX ADMIN — ACETAMINOPHEN 650 MG: 325 TABLET ORAL at 22:59

## 2019-07-04 RX ADMIN — Medication 10 ML: at 23:04

## 2019-07-04 RX ADMIN — PREDNISONE 20 MG: 20 TABLET ORAL at 08:38

## 2019-07-04 RX ADMIN — DOXYCYCLINE 100 MG: 100 INJECTION, POWDER, LYOPHILIZED, FOR SOLUTION INTRAVENOUS at 02:32

## 2019-07-04 RX ADMIN — DOXYCYCLINE 100 MG: 100 INJECTION, POWDER, LYOPHILIZED, FOR SOLUTION INTRAVENOUS at 13:35

## 2019-07-04 RX ADMIN — CEFEPIME HYDROCHLORIDE 2 G: 2 INJECTION, POWDER, FOR SOLUTION INTRAVENOUS at 20:26

## 2019-07-04 RX ADMIN — SODIUM CHLORIDE, SODIUM LACTATE, POTASSIUM CHLORIDE, AND CALCIUM CHLORIDE 75 ML/HR: 600; 310; 30; 20 INJECTION, SOLUTION INTRAVENOUS at 22:56

## 2019-07-04 RX ADMIN — BENZONATATE 100 MG: 100 CAPSULE ORAL at 11:30

## 2019-07-04 RX ADMIN — VANCOMYCIN HYDROCHLORIDE 1000 MG: 1 INJECTION, POWDER, LYOPHILIZED, FOR SOLUTION INTRAVENOUS at 17:36

## 2019-07-04 RX ADMIN — GUAIFENESIN AND CODEINE PHOSPHATE 5 ML: 10; 100 LIQUID ORAL at 11:27

## 2019-07-04 RX ADMIN — VANCOMYCIN HYDROCHLORIDE 1000 MG: 1 INJECTION, POWDER, LYOPHILIZED, FOR SOLUTION INTRAVENOUS at 00:30

## 2019-07-04 RX ADMIN — ACETAMINOPHEN 650 MG: 325 TABLET ORAL at 04:50

## 2019-07-04 RX ADMIN — CEFEPIME HYDROCHLORIDE 2 G: 2 INJECTION, POWDER, FOR SOLUTION INTRAVENOUS at 11:27

## 2019-07-04 RX ADMIN — PREDNISONE 20 MG: 20 TABLET ORAL at 17:36

## 2019-07-04 RX ADMIN — CEFEPIME HYDROCHLORIDE 2 G: 2 INJECTION, POWDER, FOR SOLUTION INTRAVENOUS at 04:46

## 2019-07-04 RX ADMIN — FAMOTIDINE 20 MG: 20 TABLET ORAL at 08:38

## 2019-07-04 RX ADMIN — ACETAMINOPHEN 650 MG: 325 TABLET ORAL at 08:41

## 2019-07-04 RX ADMIN — Medication 10 ML: at 00:30

## 2019-07-04 RX ADMIN — VANCOMYCIN HYDROCHLORIDE 1000 MG: 1 INJECTION, POWDER, LYOPHILIZED, FOR SOLUTION INTRAVENOUS at 08:38

## 2019-07-04 RX ADMIN — MAGNESIUM OXIDE TAB 400 MG (241.3 MG ELEMENTAL MG) 400 MG: 400 (241.3 MG) TAB at 08:38

## 2019-07-04 RX ADMIN — FAMOTIDINE 20 MG: 20 TABLET ORAL at 17:36

## 2019-07-04 NOTE — PROGRESS NOTES
Bedside and Verbal shift change report given to [address] [place] [address] (oncoming nurse) by Chela Akers (offgoing nurse). Report included the following information SBAR, MAR, Recent Results and Med Rec Status.

## 2019-07-04 NOTE — PROGRESS NOTES
ID Progress Note  2019    Subjective:     Still with cough and pleuritis--small amount of blood still present in sputum    Objective:     Antibiotics:  1. Cefepime   2. Vancomycin   3. Doxycycline       Vitals:   Visit Vitals  /83 (BP 1 Location: Left arm, BP Patient Position: Sitting)   Pulse 76   Temp 97.7 °F (36.5 °C)   Resp 18   Ht 5' 4\" (1.626 m)   Wt 82.6 kg (182 lb 1.6 oz)   SpO2 96%   BMI 31.26 kg/m²        Tmax:  Temp (24hrs), Av.7 °F (36.5 °C), Min:97.5 °F (36.4 °C), Max:97.9 °F (36.6 °C)      Exam:  Lungs:  diminished breath sounds R base, L base  Heart:  regular rate and rhythm  Abdomen:  soft, non-tender. Bowel sounds normal. No masses,  no organomegaly  Skin:  no rash or abnormalities    Labs:      Recent Labs     19  0447 19  0437 19  1104   WBC 18.9* 17.6* 8.8   HGB 9.3* 10.0* 12.9    206 250   BUN 13 10 19   CREA 0.63 0.86 2.86*   SGOT 22  --  53*   AP 88  --  130*   TBILI 0.5  --  1.6*       Cultures:     No results found for: [facility]  Lab Results   Component Value Date/Time    Culture result: MRSA NOT PRESENT 2019 04:54 PM    Culture result:  2019 04:54 PM         Screening of patient nares for MRSA is for surveillance purposes and, if positive, to facilitate isolation considerations in high risk settings. It is not intended for automatic decolonization interventions per se as regimens are not sufficiently effective to warrant routine use. Culture result: NO GROWTH 2 DAYS 2019 11:04 AM       Radiology:     Line/Insert Date:           Assessment:     1. Pneumonia vs bronchitis vs ?  2. Chest imaging with a few nodules  3. No epi risks of infection that I can determine  4. Leukocytosis--on steroids    Objective:     1. Continue current therapy  2. Follow up cultures and studies  3.  Discussed with patient    Flor Ward MD

## 2019-07-04 NOTE — PROGRESS NOTES
Bedside and Verbal shift change report given to Karrie Hickman (oncoming nurse) by Radha Denney (offgoing nurse). Report included the following information SBAR, Procedure Summary, Intake/Output, MAR, Accordion, Recent Results, Med Rec Status and Cardiac Rhythm NSR.

## 2019-07-04 NOTE — PROGRESS NOTES
Pharmacist Note - Vancomycin Dosing  Therapy day 3  Indication: CAP  Current regimen: 1g IV Q8h    A Trough Level resulted at 10.3 mcg/mL which was obtained ~8 hrs post-dose. Goal trough: 15 - 20 mcg/mL     Plan: Change to vancomycin 1250mg IV Q8h . Pharmacy will continue to monitor this patient daily for changes in clinical status and renal function.

## 2019-07-04 NOTE — PROGRESS NOTES
Hospitalist Progress Note  [facility], NP  Answering service: [phone] from in house phone  Cell: [phone]      Date of Service:  2019  NAME:  Jerry Bueno  :  1985  MRN:  [id number]      Admission Summary:   26 yof pmh of hypercholesterolemia, back pain who presented with flu like symptoms that started about 9 days back. Sxs associated with cough, fever, poor appetite and chills. Interval history / Subjective:     Feels a little better today. Was able to eat about 50% of dinner last night. Still with cough that is worse in the morning. Tessalon Perles help with this. No fevers. Discussed echo results. Assessment & Plan:     Shortness of breath with possible early sepsis:    ? Etiology. Probably CAP ? Septic emboli. -V/Q scan with low probability of PE.   -IVF stopped, continue IV abx, on board spectrum currently  -Duobnebs. -Pulm and ID on board.   -Flu negative, Viral panel negative, Blood Cultures NGTD  -oral steroids   -echo ef 56-60% no wall abnormalities     Acute renal failure: resolved   -Likely prerenal. Corrected with IVF     Lactic acidosis: resolved  -Resolved with IVF and antibiotic.      Hyponatremia: resolved  -Corrected with IVF. Hypokalemia: resolved   -Replenished    Indigestion:   -Probably steroid related. -Continue Pepcid and simethicone. -Decrease prednisone yesterday.      Leukocytosis:   -Probably related to steroid.  Up today, no fevers, feeling better  -Will monitor with decreased prednisone.   -Patient may not not need steroid for long.      Code status: Full  DVT prophylaxis: SCDs    Care Plan discussed with: Patient/Family  Disposition: Home w/Family and TBD     Hospital Problems  Date Reviewed: 2013          Codes Class Noted POA    Pulmonary emboli ([address] ICD-10-CM: I26.99  ICD-9-CM: 415.19  2019 Unknown        SOB (shortness of breath) ICD-10-CM: R06.02  ICD-9-CM: 786.05  7/2/2019 Unknown                Review of Systems:   +Shortness of breath, cough with occasional streaks of blood, pain with deep inspiration, decrease appetite. No nausea or vomiting, fevers or chills. Last BM today. Vital Signs:    Last 24hrs VS reviewed since prior progress note. Most recent are:  Visit Vitals  /86 (BP 1 Location: Left arm, BP Patient Position: At rest)   Pulse 68   Temp 97.5 °F (36.4 °C)   Resp 18   Ht 5' 4\" (1.626 m)   Wt 82.6 kg (182 lb 1.6 oz)   SpO2 96%   BMI 31.26 kg/m²         Intake/Output Summary (Last 24 hours) at 7/4/2019 1031  Last data filed at 7/3/2019 2243  Gross per 24 hour   Intake 2315.83 ml   Output 400 ml   Net 1915.83 ml        Physical Examination:             Constitutional:  No acute distress, cooperative, pleasant    ENT:  Oral mucous moist, oropharynx benign. Neck supple,    Resp:  CTA bilaterally. No wheezing/rhonchi/rales. No accessory muscle use   CV:  Regular rhythm, normal rate, no murmurs, gallops, rubs    GI:  Soft, non distended, non tender. normoactive bowel sounds,     Musculoskeletal:  No edema, warm, 2+ pulses throughout    Neurologic:  Moves all extremities. AAOx3, CN II-XII reviewed     Psych:  Good insight, Not anxious nor agitated.        Data Review:    Review and/or order of clinical lab test  Review and/or order of tests in the radiology section of CPT  Review and/or order of tests in the medicine section of CPT      Labs:     Recent Labs     07/04/19 0447 07/03/19 0437   WBC 18.9* 17.6*   HGB 9.3* 10.0*   HCT 27.8* 29.0*    206     Recent Labs     07/04/19 0447 07/03/19 0437 07/02/19  1104    137 130*   K 3.8 3.2* 4.0    104 93*   CO2 25 25 22   BUN 13 10 19   CREA 0.63 0.86 2.86*   * 166* 103*   CA 7.8* 8.2* 9.4   MG  --   --  2.4     Recent Labs     07/04/19 0447 07/02/19 1945 07/02/19  1104   SGOT 22  --  53*   ALT 17  --  22   AP 88  --  130*   TBILI 0.5  --  1.6*   TP 6.7  --  9.4*   ALB 2.3* --  3.2*   GLOB 4.4*  --  6.2*   LPSE  --  139  --      Recent Labs     07/02/19  1225   APTT 28.6      No results for input(s): FE, TIBC, PSAT, FERR in the last 72 hours. No results found for: FOL, RBCF   No results for input(s): PH, PCO2, PO2 in the last 72 hours.   Recent Labs     07/03/19  0437 07/03/19  0400 07/02/19  1941 07/02/19  1104   CPK 38 38  --  230*   TROIQ  --   --  <0.05 <0.05     Lab Results   Component Value Date/Time    Cholesterol, total 164 06/04/2013 09:12 AM    HDL Cholesterol 69 06/04/2013 09:12 AM    LDL, calculated 69 06/04/2013 09:12 AM    Triglyceride 129 06/04/2013 09:12 AM     Lab Results   Component Value Date/Time    Glucose (POC) 113 (H) 07/02/2019 11:27 AM     Lab Results   Component Value Date/Time    Color DARK YELLOW 07/02/2019 04:53 PM    Appearance CLOUDY (A) 07/02/2019 04:53 PM    Specific gravity 1.014 07/02/2019 04:53 PM    pH (UA) 5.5 07/02/2019 04:53 PM    Protein 30 (A) 07/02/2019 04:53 PM    Glucose NEGATIVE  07/02/2019 04:53 PM    Ketone TRACE (A) 07/02/2019 04:53 PM    Urobilinogen 2.0 (H) 07/02/2019 04:53 PM    Nitrites NEGATIVE  07/02/2019 04:53 PM    Leukocyte Esterase NEGATIVE  07/02/2019 04:53 PM    Epithelial cells MODERATE (A) 07/02/2019 04:53 PM    Bacteria NEGATIVE  07/02/2019 04:53 PM    WBC 0-4 07/02/2019 04:53 PM    RBC 5-10 07/02/2019 04:53 PM         Medications Reviewed:     Current Facility-Administered Medications   Medication Dose Route Frequency    vancomycin (VANCOCIN) 1,000 mg in 0.9% sodium chloride (MBP/ADV) 250 mL  1,000 mg IntraVENous Q8H    famotidine (PEPCID) tablet 20 mg  20 mg Oral BID    alum-mag hydroxide-simeth (MYLANTA) oral suspension 30 mL  30 mL Oral Q4H PRN    magnesium oxide (MAG-OX) tablet 400 mg  400 mg Oral DAILY    predniSONE (DELTASONE) tablet 20 mg  20 mg Oral BID WITH MEALS    cefepime (MAXIPIME) 2 g in 0.9% sodium chloride (MBP/ADV) 100 mL  2 g IntraVENous Q8H    albuterol-ipratropium (DUO-NEB) 2.5 MG-0.5 MG/3 ML 3 mL Nebulization Q4H PRN    guaiFENesin-codeine (ROBITUSSIN AC) 100-10 mg/5 mL solution 5 mL  5 mL Oral Q4H PRN    sodium chloride (NS) flush 5-10 mL  5-10 mL IntraVENous PRN    sodium chloride (NS) flush 5-40 mL  5-40 mL IntraVENous Q8H    sodium chloride (NS) flush 5-40 mL  5-40 mL IntraVENous PRN    acetaminophen (TYLENOL) tablet 650 mg  650 mg Oral Q4H PRN    lactated Ringers infusion  75 mL/hr IntraVENous CONTINUOUS    benzonatate (TESSALON) capsule 100 mg  100 mg Oral TID PRN    doxycycline (VIBRAMYCIN) 100 mg in 0.9% sodium chloride (MBP/ADV) 100 mL  100 mg IntraVENous Q12H    Vancomycin - pharmacy to dose   Other Rx Dosing/Monitoring     ______________________________________________________________________  EXPECTED LENGTH OF STAY: 3d 16h  ACTUAL LENGTH OF STAY:          2                 Aydee Suresh NP

## 2019-07-04 NOTE — PROGRESS NOTES
Bedside and Verbal shift change report given to MATTHEW Jackson (oncoming nurse) by Solis Restrepo RN (offgoing nurse).  Report included the following information SBAR, Kardex, Intake/Output, MAR and Recent Results.

## 2019-07-05 VITALS
OXYGEN SATURATION: 95 % | HEART RATE: 67 BPM | WEIGHT: 195.99 LBS | DIASTOLIC BLOOD PRESSURE: 86 MMHG | BODY MASS INDEX: 33.46 KG/M2 | TEMPERATURE: 97.3 F | SYSTOLIC BLOOD PRESSURE: 159 MMHG | HEIGHT: 64 IN | RESPIRATION RATE: 18 BRPM

## 2019-07-05 PROBLEM — N17.9 ARF (ACUTE RENAL FAILURE) (HCC): Status: ACTIVE | Noted: 2019-07-05

## 2019-07-05 PROBLEM — A41.9 SEPSIS (HCC): Status: ACTIVE | Noted: 2019-07-05

## 2019-07-05 PROBLEM — E87.1 HYPONATREMIA: Status: ACTIVE | Noted: 2019-07-05

## 2019-07-05 PROBLEM — K30 INDIGESTION: Status: ACTIVE | Noted: 2019-07-05

## 2019-07-05 PROBLEM — E87.6 HYPOKALEMIA: Status: ACTIVE | Noted: 2019-07-05

## 2019-07-05 PROBLEM — J20.9 BRONCHITIS, ACUTE: Status: ACTIVE | Noted: 2019-07-05

## 2019-07-05 LAB
ANION GAP SERPL CALC-SCNC: 7 MMOL/L (ref 5–15)
BASOPHILS # BLD: 0 K/UL (ref 0–0.1)
BASOPHILS NFR BLD: 0 % (ref 0–1)
BUN SERPL-MCNC: 11 MG/DL (ref 6–20)
BUN/CREAT SERPL: 17 (ref 12–20)
CALCIUM SERPL-MCNC: 8.4 MG/DL (ref 8.5–10.1)
CHLORIDE SERPL-SCNC: 103 MMOL/L (ref 97–108)
CO2 SERPL-SCNC: 26 MMOL/L (ref 21–32)
CREAT SERPL-MCNC: 0.64 MG/DL (ref 0.55–1.02)
DIFFERENTIAL METHOD BLD: ABNORMAL
EOSINOPHIL # BLD: 0 K/UL (ref 0–0.4)
EOSINOPHIL NFR BLD: 0 % (ref 0–7)
ERYTHROCYTE [DISTWIDTH] IN BLOOD BY AUTOMATED COUNT: 13.5 % (ref 11.5–14.5)
GLUCOSE SERPL-MCNC: 120 MG/DL (ref 65–100)
HCT VFR BLD AUTO: 30.6 % (ref 35–47)
HGB BLD-MCNC: 10 G/DL (ref 11.5–16)
IMM GRANULOCYTES # BLD AUTO: 0 K/UL
IMM GRANULOCYTES NFR BLD AUTO: 0 %
LYMPHOCYTES # BLD: 1.9 K/UL (ref 0.8–3.5)
LYMPHOCYTES NFR BLD: 13 % (ref 12–49)
MCH RBC QN AUTO: 29.7 PG (ref 26–34)
MCHC RBC AUTO-ENTMCNC: 32.7 G/DL (ref 30–36.5)
MCV RBC AUTO: 90.8 FL (ref 80–99)
MONOCYTES # BLD: 1.7 K/UL (ref 0–1)
MONOCYTES NFR BLD: 12 % (ref 5–13)
NEUTS SEG # BLD: 10.9 K/UL (ref 1.8–8)
NEUTS SEG NFR BLD: 75 % (ref 32–75)
NRBC # BLD: 0 K/UL (ref 0–0.01)
NRBC BLD-RTO: 0 PER 100 WBC
PLATELET # BLD AUTO: 358 K/UL (ref 150–400)
PMV BLD AUTO: 10.1 FL (ref 8.9–12.9)
POTASSIUM SERPL-SCNC: 3.7 MMOL/L (ref 3.5–5.1)
RBC # BLD AUTO: 3.37 M/UL (ref 3.8–5.2)
RBC MORPH BLD: ABNORMAL
SODIUM SERPL-SCNC: 136 MMOL/L (ref 136–145)
WBC # BLD AUTO: 14.5 K/UL (ref 3.6–11)
WBC MORPH BLD: ABNORMAL

## 2019-07-05 PROCEDURE — 74011250637 HC RX REV CODE- 250/637: Performed by: NURSE PRACTITIONER

## 2019-07-05 PROCEDURE — 74011000258 HC RX REV CODE- 258: Performed by: INTERNAL MEDICINE

## 2019-07-05 PROCEDURE — 74011250636 HC RX REV CODE- 250/636: Performed by: FAMILY MEDICINE

## 2019-07-05 PROCEDURE — 74011250637 HC RX REV CODE- 250/637: Performed by: FAMILY MEDICINE

## 2019-07-05 PROCEDURE — 74011000258 HC RX REV CODE- 258: Performed by: FAMILY MEDICINE

## 2019-07-05 PROCEDURE — 85025 COMPLETE CBC W/AUTO DIFF WBC: CPT

## 2019-07-05 PROCEDURE — 77010033678 HC OXYGEN DAILY

## 2019-07-05 PROCEDURE — 94760 N-INVAS EAR/PLS OXIMETRY 1: CPT

## 2019-07-05 PROCEDURE — 80048 BASIC METABOLIC PNL TOTAL CA: CPT

## 2019-07-05 PROCEDURE — 74011636637 HC RX REV CODE- 636/637: Performed by: FAMILY MEDICINE

## 2019-07-05 PROCEDURE — 74011250636 HC RX REV CODE- 250/636: Performed by: INTERNAL MEDICINE

## 2019-07-05 PROCEDURE — 36415 COLL VENOUS BLD VENIPUNCTURE: CPT

## 2019-07-05 RX ORDER — BENZONATATE 100 MG/1
100 CAPSULE ORAL
Qty: 21 CAP | Refills: 0 | Status: SHIPPED | OUTPATIENT
Start: 2019-07-05 | End: 2019-07-11 | Stop reason: SDUPTHER

## 2019-07-05 RX ORDER — CYCLOBENZAPRINE HCL 10 MG
5 TABLET ORAL
Qty: 10 TAB | Refills: 0 | Status: SHIPPED | OUTPATIENT
Start: 2019-07-05 | End: 2019-07-25 | Stop reason: ALTCHOICE

## 2019-07-05 RX ORDER — PREDNISONE 20 MG/1
20 TABLET ORAL DAILY
Qty: 5 TAB | Refills: 0 | Status: SHIPPED | OUTPATIENT
Start: 2019-07-05 | End: 2019-07-10

## 2019-07-05 RX ORDER — DOXYCYCLINE 100 MG/1
100 CAPSULE ORAL 2 TIMES DAILY
Qty: 28 CAP | Refills: 0 | Status: SHIPPED | OUTPATIENT
Start: 2019-07-05 | End: 2019-07-19

## 2019-07-05 RX ORDER — CYCLOBENZAPRINE HCL 10 MG
5 TABLET ORAL
Status: DISCONTINUED | OUTPATIENT
Start: 2019-07-05 | End: 2019-07-05 | Stop reason: HOSPADM

## 2019-07-05 RX ORDER — CODEINE PHOSPHATE AND GUAIFENESIN 10; 100 MG/5ML; MG/5ML
5 SOLUTION ORAL
Qty: 1 BOTTLE | Refills: 0 | Status: SHIPPED | OUTPATIENT
Start: 2019-07-05 | End: 2019-07-08

## 2019-07-05 RX ORDER — DOXYCYCLINE 100 MG/1
100 CAPSULE ORAL 2 TIMES DAILY
Qty: 28 CAP | Refills: 0 | Status: SHIPPED | OUTPATIENT
Start: 2019-07-05 | End: 2019-07-05 | Stop reason: SDUPTHER

## 2019-07-05 RX ORDER — FAMOTIDINE 20 MG/1
20 TABLET, FILM COATED ORAL 2 TIMES DAILY
Qty: 14 TAB | Refills: 0 | Status: SHIPPED | OUTPATIENT
Start: 2019-07-05 | End: 2019-07-12

## 2019-07-05 RX ADMIN — VANCOMYCIN HYDROCHLORIDE 1250 MG: 10 INJECTION, POWDER, LYOPHILIZED, FOR SOLUTION INTRAVENOUS at 09:07

## 2019-07-05 RX ADMIN — PREDNISONE 20 MG: 20 TABLET ORAL at 09:07

## 2019-07-05 RX ADMIN — ACETAMINOPHEN 650 MG: 325 TABLET ORAL at 04:36

## 2019-07-05 RX ADMIN — MAGNESIUM OXIDE TAB 400 MG (241.3 MG ELEMENTAL MG) 400 MG: 400 (241.3 MG) TAB at 09:07

## 2019-07-05 RX ADMIN — CYCLOBENZAPRINE HYDROCHLORIDE 5 MG: 10 TABLET, FILM COATED ORAL at 11:04

## 2019-07-05 RX ADMIN — VANCOMYCIN HYDROCHLORIDE 1250 MG: 10 INJECTION, POWDER, LYOPHILIZED, FOR SOLUTION INTRAVENOUS at 01:10

## 2019-07-05 RX ADMIN — ACETAMINOPHEN 650 MG: 325 TABLET ORAL at 11:04

## 2019-07-05 RX ADMIN — CEFEPIME HYDROCHLORIDE 2 G: 2 INJECTION, POWDER, FOR SOLUTION INTRAVENOUS at 04:34

## 2019-07-05 RX ADMIN — FAMOTIDINE 20 MG: 20 TABLET ORAL at 09:07

## 2019-07-05 RX ADMIN — Medication 1 CAPSULE: at 09:07

## 2019-07-05 RX ADMIN — DOXYCYCLINE 100 MG: 100 INJECTION, POWDER, LYOPHILIZED, FOR SOLUTION INTRAVENOUS at 01:12

## 2019-07-05 NOTE — DISCHARGE INSTRUCTIONS
Discharge Instructions       PATIENT ID: Elie Segundo  MRN: [id number]   YOB: 1985    DATE OF ADMISSION: 7/2/2019 10:46 AM    DATE OF DISCHARGE: 7/5/2019    PRIMARY CARE PROVIDER: None     ATTENDING PHYSICIAN: Alexandria Monsalve MD  DISCHARGING PROVIDER: [facility], NP    To contact this individual call [phone] and ask the  to page. If unavailable ask to be transferred the Adult Hospitalist Department. DISCHARGE DIAGNOSES Sepsis from pneumonia vs bronchitis    CONSULTATIONS: IP CONSULT TO HOSPITALIST  IP CONSULT TO PULMONOLOGY  IP CONSULT TO INFECTIOUS DISEASES    PROCEDURES/SURGERIES: * No surgery found *    PENDING TEST RESULTS:   At the time of discharge the following test results are still pending: blood cultures (these currently are negative if anything changes we will contact you)    FOLLOW UP APPOINTMENTS:   Follow-up Information     Follow up With Specialties Details Why Contact Info    Best Frank MD Internal Medicine, Pulmonary Disease  follow up with pulmonary in 4-6 weeks for repeat chest imaging  [address]  [phone]             ADDITIONAL CARE RECOMMENDATIONS:   1. You have been prescribed the following new medications:  - Doxycycline this is an antibiotic. A side effect of this medication is diarrhea therefore recommending you take a probiotic while on this medication.   - Prednisone this is a steroid to help decrease the inflammation in your airways. - Albuterol this is an inhaler you can use as needed for shortness of breath. - Tessalon Perles you can take this as needed for coughing   -  Robitussion AC this is a cough syrup with pain medication that you can take as needed. Please note this can make you drowsy. Do not drive or operate heavy machinery while taking the medication   -  Flexeril this is an as need muscle relaxant, please note this medication can make you drowsy.    -Pepcid this is to help with your indigestion while on the steroid. See below for more information to include common side effect. 2. Make sure to follow up with pulmonary in 4-6 weeks for repeat imaging to follow up on the pulmonary nodules seen here. 3. Recommending getting set up with a pcp in 1-2 weeks for hospital follow up and continued outpatient care. 4. Make sure to get plenty of rest and drink lots of fluids (minimum 64 oz of water a day). DIET: Regular    ACTIVITY: Activity as tolerated    DISCHARGE MEDICATIONS:  Doxycycline (By mouth)   Doxycycline (nwe-e-UYB-kleen)  Treats and prevents infections. Also used to prevent malaria and treat rosacea or severe acne. This medicine is a tetracycline antibiotic. Brand Name(s): Acticlate, Adoxa, Adoxa Ramo 1/150, Avidoxy, Avidoxy DK, BenzoDox 30 Kit, BenzoDox 60 Kit, Doryx, Doryx MPC, Monodox, Morgidox 0M299IM, Morgidox 0h109VN Kit, Morgidox 1x50MG, Morgidox 1x50MG Kit, Morgidox 1U847OO   There may be other brand names for this medicine. When This Medicine Should Not Be Used: This medicine is not right for everyone. Do not use it if you had an allergic reaction to doxycycline or another tetracycline antibiotic, or if you are pregnant or breastfeeding. How to Use This Medicine:   Capsule, Delayed Release Capsule, Long Acting Capsule, Liquid, Tablet, Delayed Release Tablet  · Your doctor will tell you how much medicine to use. Do not use more than directed. · Ask your pharmacist or doctor if you need to take this medicine with or without food. Some forms can be taken with food or milk, but others must be taken on an empty stomach. · Oracea® capsules: This medicine must be taken on an empty stomach, at least 1 hour before or 2 hours after a meal.  · Capsule: Swallow whole. Do not break, crush, chew, or open it. · Delayed-release tablets: You may also take this medicine by sprinkling the broken tablets onto room-temperature applesauce. Swallow this mixture right away.  Do not chew it. Do not store the mixture for later use. You may take this medicine with food or milk to avoid stomach upset. · Oral liquid: Shake the bottle well just before each use. Measure the oral liquid medicine with a marked measuring spoon, oral syringe, or medicine cup. · Tablets: You may take this medicine with food or milk to avoid stomach irritation. To break a tablet, hold the tablet between your thumb and index fingers close to the appropriate scored line. Then, apply enough pressure to snap the tablet segments apart. Do not use the tablet if it does not break on the scored lines. · Take all of the medicine in your prescription to clear up your infection, even if you feel better after the first few doses. · Drink plenty of fluids to avoid throat problems, if you take the capsule or tablet form. · Malaria prevention: Start taking the medicine 1 or 2 days before you travel. Take the medicine every day during your trip. Keep taking it for 4 weeks after you return. However, do not use the medicine for longer than 4 months. · Do not use this medicine for more than 9 months if you are using it for rosacea. · Use only the brand of medicine your doctor prescribed. Other brands may not work the same way. · Read and follow the patient instructions that come with this medicine. Talk to your doctor or pharmacist if you have any questions. · Missed dose: Take a dose as soon as you remember. If it is almost time for your next dose, wait until then and take a regular dose. Do not take extra medicine to make up for a missed dose. · Store the medicine in a closed container at room temperature, away from heat, moisture, and direct light. Do not freeze the oral liquid. Drugs and Foods to Avoid:   Ask your doctor or pharmacist before using any other medicine, including over-the-counter medicines, vitamins, and herbal products. · Some medicines can affect how doxycycline works.  Tell your doctor if you are using any of the following:  ¨ Bismuth subsalicylate  ¨ Acne medicines (including isotretinoin)  ¨ Birth control pills  ¨ Blood thinner (including warfarin)  ¨ Medicine for seizures (including carbamazepine, phenobarbital, phenytoin)  ¨ Medicine that contains aluminum, calcium, or iron (including an antacid or vitamin supplement)  ¨ Medicine to treat psoriasis (including acitretin)  ¨ Penicillin antibiotic  ¨ Stomach medicine  Warnings While Using This Medicine:   · This medicine may cause birth defects if either partner is using it during conception or pregnancy. Tell your doctor right away if you or your partner becomes pregnant. Birth control pills may not work as well when used with this medicine. Use a second form of birth control to keep from getting pregnant. · Tell your doctor if you have kidney disease, liver disease, asthma, or an allergy to sulfites. Tell your doctor if you had surgery on your stomach, or if you have a history of yeast infections. · This medicine may cause the following problems:  ¨ Permanent change in tooth color (in children younger than 6years old)  ¨ Increased pressure inside the head  ¨ Yeast infection  ¨ Immune system problems  · This medicine can cause diarrhea. Call your doctor if the diarrhea becomes severe, does not stop, or is bloody. Do not take any medicine to stop diarrhea until you have talked to your doctor. Diarrhea can occur 2 months or more after you stop taking this medicine. · This medicine may make your skin more sensitive to sunlight. Wear sunscreen. Do not use sunlamps or tanning beds. · Tell any doctor or dentist who treats you that you are using this medicine. This medicine may affect certain medical test results. · Call your doctor if your symptoms do not improve or if they get worse. · Your doctor will do lab tests at regular visits to check on the effects of this medicine. Keep all appointments. · Keep all medicine out of the reach of children.  Never share your medicine with anyone. Possible Side Effects While Using This Medicine:   Call your doctor right away if you notice any of these side effects:  · Allergic reaction: Itching or hives, swelling in your face or hands, swelling or tingling in your mouth or throat, chest tightness, trouble breathing  · Blistering, peeling, red skin rash  · Burning, pain, or irritation in your upper stomach or throat  · Diarrhea that may contain blood  · Fever, chills, cough, runny or stuffy nose, sore throat, body aches  · Joint pain, fever, rash, and unusual tiredness or weakness  · Severe headache, dizziness, vision changes  · Sudden and severe stomach pain, nausea, vomiting, lightheadedness  If you notice these less serious side effects, talk with your doctor:   · Darkening of your skin, scars, teeth, or gums  · Sores or white patches on your lips, mouth, or throat  If you notice other side effects that you think are caused by this medicine, tell your doctor. Call your doctor for medical advice about side effects. You may report side effects to FDA at 3-537-FDA-7271  © 2017 [address] Information is for End User's use only and may not be sold, redistributed or otherwise used for commercial purposes. The above information is an  only. It is not intended as medical advice for individual conditions or treatments. Talk to your doctor, nurse or pharmacist before following any medical regimen to see if it is safe and effective for you. Albuterol (AccuNeb, Proventil, Proventil HFA, Ventolin HFA) - (By breathing)   Why this medicine is used:   Treats or prevents bronchospasm.   Contact a nurse or doctor right away if you have:  · Trouble breathing, increased wheezing or cough, chest tightness  · Fast, pounding, or uneven heartbeat; chest pain  · Muscle cramps, nausea, vomiting  · Dry mouth, increased thirst     Common side effects:  · Tremors, nervousness  · Cough, sore throat, runny or stuffy nose  · Headache  © 2017 [facility] Information is for End User's use only and may not be sold, redistributed or otherwise used for commercial purposes. Antacid, Calcium and Magnesium (Mylanta Supreme, Pepcid Complete, Rolaids Regular Strength, Tums Dual Action) - (By mouth)   Why this medicine is used:   Treats indigestion, heartburn, and upset stomach. Contact a nurse or doctor right away if you have:  · Irregular heartbeat  · Excessive weakness or tiredness     Common side effects:  · Constipation or diarrhea  © 2017 [address] is for End User's use only and may not be sold, redistributed or otherwise used for commercial purposes. Benzonatate (Tessalon Perles, Zonatuss) - (By mouth)   Why this medicine is used:   Treats cough. Contact a nurse or doctor right away if you have:  · Skin rash     Common side effects:  · Drowsiness  · Nausea, upset stomach  · Headache  © 2017 [address] is for End User's use only and may not be sold, redistributed or otherwise used for commercial purposes. Prednisone (predniSONE Intensol, Prednicot, Deltasone, Lu) - (By mouth)   Why this medicine is used:   Treats many diseases and conditions, especially problems related to inflammation. Contact a nurse or doctor right away if you have:  · Rapid weight gain; swelling in your hands, ankles, or feet  · Severe stomach pain, nausea, vomiting, or red or black stools  · Depression, unusual thoughts, feelings, or behaviors, trouble sleeping  · Fever, chills, cough, sore throat, and body aches  · Trouble seeing, eye pain     Common side effects:  · Increased appetite, weight gain  · Round, puffy face  · Muscle pain, weakness  © 2017 [facility] Information is for End User's use only and may not be sold, redistributed or otherwise used for commercial purposes.         See Medication Reconciliation Form    · It is important that you take the medication exactly as they are prescribed. · Keep your medication in the bottles provided by the pharmacist and keep a list of the medication names, dosages, and times to be taken in your wallet. · Do not take other medications without consulting your doctor. NOTIFY YOUR PHYSICIAN FOR ANY OF THE FOLLOWING:   Fever over 101 degrees for 24 hours. Chest pain, shortness of breath, fever, chills, nausea, vomiting, diarrhea, change in mentation, falling, weakness, bleeding. Severe pain or pain not relieved by medications. Or, any other signs or symptoms that you may have questions about. DISPOSITION:  X  Home With:   OT  PT  HH  RN       SNF/Inpatient Rehab/LTAC    Independent/assisted living    Hospice    Other:       PROBLEM LIST Updated:   Yes X       Signed:   Barb Montgomery NP  7/5/2019  10:41 AM

## 2019-07-05 NOTE — PROGRESS NOTES
Bedside and Verbal shift change report given to Stacy(oncoming nurse) by Jose Luis Bowden (offgoing nurse). Report included the following information SBAR, Kardex, Intake/Output, MAR, Accordion, Recent Results, Med Rec Status and Cardiac Rhythm NSR.

## 2019-07-05 NOTE — DISCHARGE SUMMARY
Discharge Summary       PATIENT ID: Gino Cortez  MRN: [id number]   YOB: 1985    DATE OF ADMISSION: 7/2/2019 10:46 AM    DATE OF DISCHARGE: 7/5/2019   PRIMARY CARE PROVIDER: Sachin Chandra MD     ATTENDING PHYSICIAN: Kalani Dawson  DISCHARGING PROVIDER: Magdalena Hernandez NP    To contact this individual call [phone] and ask the  to page. If unavailable ask to be transferred the Adult Hospitalist Department. CONSULTATIONS: IP CONSULT TO PULMONOLOGY  IP CONSULT TO INFECTIOUS DISEASES    PROCEDURES/SURGERIES: * No surgery found *    ADMITTING DIAGNOSES & HOSPITAL COURSE:   Dx: Shortness of breath 2/2 Pneumonia vs Bronchitis, ARF, Hypokalemia, Hyponatremia     41-year-old female with past medical history of hypercholesteremia and back pain, currently on OCPs, who presents to the hospital with the above-mentioned symptom. History was obtained from the patient. The patient appears to be in quite a distress. Reports that she started having flu-like symptoms about 9 days back. The patient reports that she had a cough associated with mild productive sputum which intermittently had \"specs of blood in it. \"  Also had some rhinorrhea and this was while she was back in [place] for a vacation. The patient reports that she came back to [place], continued to have shortness of breath, rhinorrhea and started developing headache. She took some Advil, but that did not seem to help, got concerned and went to [facility].  The patient reports meanwhile she had poor appetite, trouble eating and drinking, fatigue, had a fever of around 101, chills and chest pain. The patient reports that last Saturday, she went to [facility], it was thought that she has bronchitis, was given Zithromax, started taking her medication, but symptoms persisted. The patient reports that she went to [facility] again.   They were concerned that the chest x-ray shows pneumonia, and she was sent to the ER for further management and evaluation. While sitting in bed, the patient appears to be quite tachypneic, has been taking shallow respirations. Reports that she has pain all over her chest in a band-like fashion, because of which she is not able to breathe very well. CT on admission showing Multiple lung nodules. Patient creatinine elevated unable to obtain CTA, VQ scan showing low probability and dopplers negative for DVT. Pulmonary and ID were consulted. Patient was giving IV abx and steroids with nebulizer treatments. Patient now improved. VSS. Viral panel and cultures negative. Unsure if source of sepsis from CAP vs bronchitis, discussed with ID and pulmonary at time of discharge, recommending abx and steroids with repeat imaging OP for f/u on pulmonary nodules. Of note patient did not have pcp, CM consulted and pcp appointment made. Discharge plan and medications discussed with patient. Patient stable for discharge home. DISCHARGE DIAGNOSES / PLAN:      Sepsis 2/2 to CAP vs Bronchitis  -V/Q scan with low probability of PE.   -IVF stopped, given IV abx broad spectrum, switched to Doxy x14 days per ID recommendations.   -Pulm and ID on board. -CT showing mulitple pulmonary nodules, pt to follow up with pulmonary for repeat imagining   -Flu negative, Viral panel negative, Blood Cultures NGTD  -oral steroids, cough suppressants, prn Albuterol   -echo ef 56-60% no wall abnormalities     Acute renal failure: resolved   -Likely prerenal. Corrected with IVF     Lactic acidosis: resolved  -Resolved with IVF and antibiotic.      Hyponatremia: resolved  -Corrected with IVF.      Hypokalemia: resolved   -Replenished     Indigestion:   -Probably steroid related. -Continue Pepcid and simethicone. -Decrease prednisone yesterday.      Leukocytosis: improving  -Probably related to steroid. , no fevers, feeling better  -Will monitor with decreased prednisone.   -Patient may not not need steroid for long.     Muscle Spasms  -suspect pulled muscle from coughing   -add Flexeril        PENDING TEST RESULTS:   At the time of discharge the following test results are still pending: blood cultures    FOLLOW UP APPOINTMENTS:    Follow-up Information     Follow up With Specialties Details Why Contact Info    Lizy Arcos MD Internal Medicine, Pulmonary Disease  follow up with pulmonary in 4-6 weeks for repeat chest imaging  [address]      Birder Come, MD [facility] On 7/11/2019 Hospital f/u new PCP appointment Thursday, 7/11/19 @ 2:45 p.m. Please arrive 15 minutes early with photo ID, insurance card and a listing of all medications. [address] [facility] [address] [facility]  [phone]             ADDITIONAL CARE RECOMMENDATIONS:   1. You have been prescribed the following new medications:  - Doxycycline this is an antibiotic. A side effect of this medication is diarrhea therefore recommending you take a probiotic while on this medication.   - Prednisone this is a steroid to help decrease the inflammation in your airways. - Albuterol this is an inhaler you can use as needed for shortness of breath. - Tessalon Perles you can take this as needed for coughing   -  Robitussion AC this is a cough syrup with pain medication that you can take as needed. Please note this can make you drowsy. Do not drive or operate heavy machinery while taking the medication   -  Flexeril this is an as need muscle relaxant, please note this medication can make you drowsy. -Pepcid this is to help with your indigestion while on the steroid. See below for more information to include common side effect. 2. Make sure to follow up with pulmonary in 4-6 weeks for repeat imaging to follow up on the pulmonary nodules seen here. 3. Recommending getting set up with a pcp in 1-2 weeks for hospital follow up and continued outpatient care.    4. Make sure to get plenty of rest and drink lots of fluids (minimum 64 oz of water a day). DIET: Regular    ACTIVITY: Activity as tolerated       DISCHARGE MEDICATIONS:  Discharge Medication List as of 7/5/2019 11:39 AM      START taking these medications    Details   benzonatate (TESSALON) 100 mg capsule Take 1 Cap by mouth three (3) times daily as needed for Cough for up to 7 days. Indications: cough, Print, Disp-21 Cap, R-0      cyclobenzaprine (FLEXERIL) 10 mg tablet Take 0.5 Tabs by mouth three (3) times daily as needed for Muscle Spasm(s). Indications: muscle spasm, Print, Disp-10 Tab, R-0      famotidine (PEPCID) 20 mg tablet Take 1 Tab by mouth two (2) times a day for 7 days. , Print, Disp-14 Tab, R-0      guaiFENesin-codeine (ROBITUSSIN AC) 100-10 mg/5 mL solution Take 5 mL by mouth every four (4) hours as needed for Cough for up to 3 days. Max Daily Amount: 30 mL. Indications: cough, Print, Disp-1 Bottle, R-0      L. acidoph & paracasei- S therm- Bifido (CAITIE-Q/RISAQUAD) 8 billion cell cap cap Take 1 Cap by mouth daily. , Print, Disp-14 Cap, R-0      predniSONE (DELTASONE) 20 mg tablet Take 20 mg by mouth daily for 5 days. , Print, Disp-5 Tab, R-0      albuterol sulfate 90 mcg/actuation aepb Take 1 Puff by inhalation every four (4) hours as needed for Other (shortness of breath). , Print, Disp-1 Inhaler, R-0         CONTINUE these medications which have CHANGED    Details   doxycycline (MONODOX) 100 mg capsule Take 1 Cap by mouth two (2) times a day for 14 days. , Print, Disp-28 Cap, R-0         CONTINUE these medications which have NOT CHANGED    Details   norethindrone-ethinyl estradiol (JUNEL FE 1/20, 28,) 1 mg-20 mcg (21)/75 mg (7) tab Take 1 Tab by mouth daily. , Historical Med         STOP taking these medications       azithromycin (ZITHROMAX) 250 mg tablet Comments:   Reason for Stopping:         ETHINYL ESTRADIOL/DROSPIRENONE (OCELLA PO) Comments:   Reason for Stopping:                 NOTIFY YOUR PHYSICIAN FOR ANY OF THE FOLLOWING: Fever over 101 degrees for 24 hours. Chest pain, shortness of breath, fever, chills, nausea, vomiting, diarrhea, change in mentation, falling, weakness, bleeding. Severe pain or pain not relieved by medications. Or, any other signs or symptoms that you may have questions about. DISPOSITION:  X  Home With:   OT  PT  HH  RN       Long term SNF/Inpatient Rehab    Independent/assisted living    Hospice    Other:       PATIENT CONDITION AT DISCHARGE:     Functional status    Poor     Deconditioned    X Independent      Cognition   X  Lucid     Forgetful     Dementia      Catheters/lines (plus indication)    [facility]     PICC     PEG    X None      Code status   X  Full code     DNR      PHYSICAL EXAMINATION AT DISCHARGE:  General: No acute distress, cooperative, pleasant   EENT: EOMI. Anicteric sclerae. Oral mucous moist, oropharynx benign  Resp: CTA bilaterally. No wheezing/rhonchi/rales. No accessory muscle use  CV: Regular rhythm, normal rate, no murmurs, gallops, rubs  GI: Soft, non distended, non tender. normoactive bowel sounds,   Extremities: No edema, warm, 2+ pulses throughout  Neurologic: Moves all extremities. AAOx3,   Psych: Good insight. Not anxious nor agitated.   Skin: Good Turgor, no rashes or ulcers      CHRONIC MEDICAL DIAGNOSES:  Problem List as of 7/5/2019 Date Reviewed: 7/5/2019          Codes Class Noted - Resolved    Sepsis ([facility] ICD-10-CM: A41.9  ICD-9-CM: 038.9, 995.91  7/5/2019 - Present        Bronchitis, acute ICD-10-CM: J20.9  ICD-9-CM: 466.0  7/5/2019 - Present        ARF (acute renal failure) ([facility] ICD-10-CM: N17.9  ICD-9-CM: 584.9  7/5/2019 - Present        Hyponatremia ICD-10-CM: E87.1  ICD-9-CM: 276.1  7/5/2019 - Present        Hypokalemia ICD-10-CM: E87.6  ICD-9-CM: 276.8  7/5/2019 - Present        Indigestion ICD-10-CM: K30  ICD-9-CM: 536.8  7/5/2019 - Present        SOB (shortness of breath) ICD-10-CM: R06.02  ICD-9-CM: 786.05  7/2/2019 - Present        Hypercholesterolemia ICD-10-CM: E78.00  ICD-9-CM: 272.0  7/23/2012 - Present              Greater than 45 minutes were spent with the patient on counseling and coordination of care    Signed:   Raul Alfaro NP  7/5/2019  10:32 AM

## 2019-07-05 NOTE — PROGRESS NOTES
Patient listed as not having a primary care physician. Hospital follow-up PCP transitional care appointment has been scheduled with Dr. Angeli Keating for Thursday, 7/11/19 at 2:45 p.m. Pending patient discharge.   Sudhakar Hwang, Care Management Specialist.

## 2019-07-05 NOTE — PROGRESS NOTES
Pulmonary, Critical Care, and Sleep Medicine~Progress Note    Name: Rainer Freeman MRN: [id number]   : 1985 Hospital: . [address]   Date: 2019 10:09 AM Admission: 2019     Impression Plan   1. CAP  2. Bilateral pulmonary nodules and pleural based; septic emboli? No reported IV drug use    3. Lactic acidosis from dehydration and sepsis   4. BROCK, better  1. ABX per ID  2. ECHO was normal   3. O2 titration above 90%   4. Consider weaning the prednisone   5. Follow up imaging in 4-6 wks with Dr Jesús Hastings  6. We will be available again to see if needed      Daily Progression:    Mild amount of blood in the am only following very hard coughing. I have reviewed the labs and previous days notes. Pertinent items are noted in HPI. OBJECTIVE:     Vital Signs:       Visit Vitals  /86 (BP 1 Location: Right arm, BP Patient Position: Sitting)   Pulse 67   Temp 97.3 °F (36.3 °C)   Resp 18   Ht 5' 4\" (1.626 m)   Wt 88.9 kg (195 lb 15.8 oz)   SpO2 95%   BMI 33.64 kg/m²      Temp (24hrs), Av.8 °F (36.6 °C), Min:97.3 °F (36.3 °C), Max:98.3 °F (36.8 °C)     Intake/Output:     Last shift: No intake/output data recorded. Last 3 shifts:  1901 -  0700  In: 4182.5 [P.O.:1160;  I.V.:3022.5]  Out: 800 [Urine:800]          Intake/Output Summary (Last 24 hours) at 2019 0936  Last data filed at 2019  Gross per 24 hour   Intake 2502.5 ml   Output 400 ml   Net 2102.5 ml       Physical Exam:                                        Exam Findings Other   General: No resp distress noted, appears stated age    [de-identified]:  No ulcers, JVD not elevated, no cervical LAD    Chest: No pectus deformity, normal chest rise b/l    HEART:  RRR, no murmurs/rubs/gallops    Lungs:  CTA b/l, no rhonchi/crackles/wheeze, diminished BS at bases    ABD: Soft/NT, non rigid mildly distended    EXT: No cyanosis/clubbing/edema, normal peripheral pulses    Skin: No rashes or ulcers, no mottling    Neuro: A/O x 3        Medications:  Current Facility-Administered Medications   Medication Dose Route Frequency    cyclobenzaprine (FLEXERIL) tablet 5 mg  5 mg Oral TID PRN    lactobac ac& pc-s.therm-b.anim (CAITIE Q/RISAQUAD)  1 Cap Oral DAILY    vancomycin (VANCOCIN) 1250 mg in  ml infusion  1,250 mg IntraVENous Q8H    famotidine (PEPCID) tablet 20 mg  20 mg Oral BID    alum-mag hydroxide-simeth (MYLANTA) oral suspension 30 mL  30 mL Oral Q4H PRN    magnesium oxide (MAG-OX) tablet 400 mg  400 mg Oral DAILY    predniSONE (DELTASONE) tablet 20 mg  20 mg Oral BID WITH MEALS    cefepime (MAXIPIME) 2 g in 0.9% sodium chloride (MBP/ADV) 100 mL  2 g IntraVENous Q8H    albuterol-ipratropium (DUO-NEB) 2.5 MG-0.5 MG/3 ML  3 mL Nebulization Q4H PRN    guaiFENesin-codeine (ROBITUSSIN AC) 100-10 mg/5 mL solution 5 mL  5 mL Oral Q4H PRN    sodium chloride (NS) flush 5-10 mL  5-10 mL IntraVENous PRN    sodium chloride (NS) flush 5-40 mL  5-40 mL IntraVENous Q8H    sodium chloride (NS) flush 5-40 mL  5-40 mL IntraVENous PRN    acetaminophen (TYLENOL) tablet 650 mg  650 mg Oral Q4H PRN    lactated Ringers infusion  75 mL/hr IntraVENous CONTINUOUS    benzonatate (TESSALON) capsule 100 mg  100 mg Oral TID PRN    doxycycline (VIBRAMYCIN) 100 mg in 0.9% sodium chloride (MBP/ADV) 100 mL  100 mg IntraVENous Q12H    Vancomycin - pharmacy to dose   Other Rx Dosing/Monitoring       Labs:  ABG Recent Labs     07/02/19  1144   PHI 7.419   PCO2I 28.0*   PO2I 88   HCO3I 18.1*   SO2I 97        CBC Recent Labs     07/05/19  0812 07/04/19  0447 07/03/19  0437   WBC 14.5* 18.9* 17.6*   HGB 10.0* 9.3* 10.0*   HCT 30.6* 27.8* 29.0*    247 206   MCV 90.8 90.3 88.1   MCH 29.7 30.2 64.2        Metabolic  Panel Recent Labs     07/05/19  0437 07/04/19  0447 07/03/19 0437 07/02/19  1104    138 137 130*   K 3.7 3.8 3.2* 4.0    104 104 93*   CO2 26 25 25 22   * 134* 166* 103*   BUN 11 13 10 19   CREA 0.64 0.63 0.86 2.86*   CA 8.4* 7.8* 8.2* 9.4   MG  --   --   --  2.4   ALB  --  2.3*  --  3.2*   SGOT  --  22  --  53*   ALT  --  17  --  22        Pertinent Labs                TALIB Arita  7/5/2019

## 2019-07-07 LAB
BACTERIA SPEC CULT: NORMAL
SERVICE CMNT-IMP: NORMAL

## 2019-07-11 ENCOUNTER — OFFICE VISIT (OUTPATIENT)
Dept: PRIMARY CARE CLINIC | Age: 34
End: 2019-07-11

## 2019-07-11 VITALS
SYSTOLIC BLOOD PRESSURE: 124 MMHG | RESPIRATION RATE: 17 BRPM | HEART RATE: 109 BPM | DIASTOLIC BLOOD PRESSURE: 85 MMHG | OXYGEN SATURATION: 98 % | HEIGHT: 64 IN | TEMPERATURE: 97.8 F | WEIGHT: 172.6 LBS | BODY MASS INDEX: 29.47 KG/M2

## 2019-07-11 DIAGNOSIS — R06.02 SOB (SHORTNESS OF BREATH): ICD-10-CM

## 2019-07-11 DIAGNOSIS — R05.9 COUGH: Primary | ICD-10-CM

## 2019-07-11 DIAGNOSIS — A41.9 SEPSIS, DUE TO UNSPECIFIED ORGANISM: ICD-10-CM

## 2019-07-11 DIAGNOSIS — D72.829 LEUKOCYTOSIS, UNSPECIFIED TYPE: ICD-10-CM

## 2019-07-11 DIAGNOSIS — R00.0 TACHYCARDIA: ICD-10-CM

## 2019-07-11 RX ORDER — BENZONATATE 100 MG/1
100 CAPSULE ORAL
Qty: 21 CAP | Refills: 0 | Status: SHIPPED | OUTPATIENT
Start: 2019-07-11 | End: 2019-07-18

## 2019-07-11 NOTE — PROGRESS NOTES
Subjective:     Chief Complaint   Patient presents with   [facility] Follow Up     7/2/19 went to [facility] for SOB        She  is a 26y.o. year old female with hx of HLD  is here as a new patient to establish and follow up form her recent hospital admission. Reports that   On  7/2/2019 [facility] with a concern about PNA . Reports that she was having cold symptoms with poor appetite, lethargy, fever, chills, chest pain for about 9 days prior to the admission. CT on admission show multiple lung nodules. VQ scan showing low probability and dopplers negative for DVT. Pulmonary and ID were consulted. Patient was treated with IV abx and steroids with nebulizer treatments. Viral panel and blood cultures negative. Unsure of the source of the PNA. She was discharged on 7/5/2019 and ID recommended Doxycycline X 14 days and steroids . Reports that she finished  steroid yesterday. She has been feeling lot better. She still has cough but no bad coughing spells. Back pain has resolved. She was recommended to have  repeat imaging OP for f/u on pulmonary nodules. No other concerns. Hospital records reviewed. A comprehensive review of systems was negative except for that written in the HPI. Objective:     Vitals:    07/11/19 1447   BP: 124/85   Pulse: (!) 121   Resp: 17   Temp: 97.8 °F (36.6 °C)   TempSrc: Oral   SpO2: 98%   Weight: 172 lb 9.6 oz (78.3 kg)   Height: 5' 4\" (1.626 m)       Physical Examination: General appearance - alert, well appearing, and in no distress, oriented to person, place, and time and overweight  Mental status - alert, oriented to person, place, and time, normal mood, behavior, speech, dress, motor activity, and thought processes, looks nervous.   Ears - bilateral TM's and external ear canals normal  Nose - normal and patent, no erythema, discharge or polyps  Mouth - mucous membranes moist, pharynx normal without lesions  Neck - supple, no significant adenopathy  Chest - clear to auscultation, no wheezes, rales or rhonchi, symmetric air entry  Heart - normal rate, regular rhythm, normal S1, S2, no murmurs, rubs, clicks or gallops  Neurological - alert, oriented, normal speech, no focal findings or movement disorder noted  Extremities - no pedal edema noted    Allergies   Allergen Reactions    Latex Other (comments)     Patient states she is not allergic to Latex- 7/11/19      Social History     Socioeconomic History    Marital status: SINGLE     Spouse name: Not on file    Number of children: Not on file    Years of education: Not on file    Highest education level: Not on file   Tobacco Use    Smoking status: Never Smoker    Smokeless tobacco: Never Used   Substance and Sexual Activity    Alcohol use: Yes     Alcohol/week: 0.0 oz     Types: 1 - 2 Glasses of wine per week     Comment: has not had any wine since being sick    Drug use: No      Family History   Problem Relation Age of Onset    Liver Disease Mother     High Cholesterol Mother     Elevated Lipids Mother     Liver Disease Father     Hypertension Father       History reviewed. No pertinent surgical history. Past Medical History:   Diagnosis Date    Back pain     L5 S1 Disk    Hypercholesterolemia       Current Outpatient Medications   Medication Sig Dispense Refill    benzonatate (TESSALON) 100 mg capsule Take 1 Cap by mouth three (3) times daily as needed for Cough for up to 7 days. Indications: cough 21 Cap 0    cyclobenzaprine (FLEXERIL) 10 mg tablet Take 0.5 Tabs by mouth three (3) times daily as needed for Muscle Spasm(s). Indications: muscle spasm 10 Tab 0    famotidine (PEPCID) 20 mg tablet Take 1 Tab by mouth two (2) times a day for 7 days. 14 Tab 0    L. acidoph & paracasei- S therm- Bifido (CAITIE-Q/RISAQUAD) 8 billion cell cap cap Take 1 Cap by mouth daily.  14 Cap 0    albuterol sulfate 90 mcg/actuation aepb Take 1 Puff by inhalation every four (4) hours as needed for Other (shortness of breath). 1 Inhaler 0    doxycycline (MONODOX) 100 mg capsule Take 1 Cap by mouth two (2) times a day for 14 days. 28 Cap 0    norethindrone-ethinyl estradiol (JUNEL FE 1/20, 28,) 1 mg-20 mcg (21)/75 mg (7) tab Take 1 Tab by mouth daily. Assessment/ Plan:   Diagnoses and all orders for this visit:    1. Cough  -   Feeling lot better per patient. Continue benzonatate (TESSALON) 100 mg capsule; Take 1 Cap by mouth three (3) times daily as needed for Cough for up to 7 days. Indications: cough    2. Leukocytosis, unspecified type  -     CBC WITH AUTOMATED DIFF    3. SOB (shortness of breath)      - Improving slowly. Continue cough med and as needed albuterol. Repeat CT lung in 4-6 weeks. She will schedule with Pulmonologist.   4. Sepsis, due to unspecified organism ([address]       - No cause found. Blood Cx was negative. 5. Tachycardia  -     TSH AND FREE T4               Recent EKG and Echo is normal.         Medication risks/benefits/costs/interactions/alternatives discussed with patient. Advised patient to call back or return to office if symptoms worsen/change/persist. If patient cannot reach us or should anything more severe/urgent arise he/she should proceed directly to the nearest emergency department. Discussed expected course/resolution/complications of diagnosis in detail with patient. Patient given a written after visit summary which includes her diagnoses, current medications and vitals. Patient expressed understanding with the diagnosis and plan. /  Follow-up and Dispositions    · Return in about 2 weeks (around 7/25/2019), or if symptoms worsen or fail to improve, for cough.

## 2019-07-11 NOTE — PROGRESS NOTES
Chief Complaint   Patient presents with   [facility] Follow Up     7/2/19 went to [facility] for SOB     1. Have you been to the ER, urgent care clinic since your last visit? Hospitalized since your last visit? Yes see CC    2. Have you seen or consulted any other health care providers outside of the [facility] since your last visit? Include any pap smears or colon screening.  No

## 2019-07-12 ENCOUNTER — TELEPHONE (OUTPATIENT)
Dept: PRIMARY CARE CLINIC | Age: 34
End: 2019-07-12

## 2019-07-12 LAB
BASOPHILS # BLD AUTO: 0 X10E3/UL (ref 0–0.2)
BASOPHILS NFR BLD AUTO: 0 %
EOSINOPHIL # BLD AUTO: 0.1 X10E3/UL (ref 0–0.4)
EOSINOPHIL NFR BLD AUTO: 0 %
ERYTHROCYTE [DISTWIDTH] IN BLOOD BY AUTOMATED COUNT: 12.2 % (ref 12.3–15.4)
HCT VFR BLD AUTO: 31.4 % (ref 34–46.6)
HGB BLD-MCNC: 10.5 G/DL (ref 11.1–15.9)
IMM GRANULOCYTES # BLD AUTO: 0.2 X10E3/UL (ref 0–0.1)
IMM GRANULOCYTES NFR BLD AUTO: 1 %
LYMPHOCYTES # BLD AUTO: 2.3 X10E3/UL (ref 0.7–3.1)
LYMPHOCYTES NFR BLD AUTO: 15 %
MCH RBC QN AUTO: 29.6 PG (ref 26.6–33)
MCHC RBC AUTO-ENTMCNC: 33.4 G/DL (ref 31.5–35.7)
MCV RBC AUTO: 89 FL (ref 79–97)
MONOCYTES # BLD AUTO: 0.9 X10E3/UL (ref 0.1–0.9)
MONOCYTES NFR BLD AUTO: 6 %
NEUTROPHILS # BLD AUTO: 11.8 X10E3/UL (ref 1.4–7)
NEUTROPHILS NFR BLD AUTO: 78 %
PLATELET # BLD AUTO: 821 X10E3/UL (ref 150–450)
RBC # BLD AUTO: 3.55 X10E6/UL (ref 3.77–5.28)
T4 FREE SERPL-MCNC: 1.78 NG/DL (ref 0.82–1.77)
TSH SERPL DL<=0.005 MIU/L-ACNC: 0.84 UIU/ML (ref 0.45–4.5)
WBC # BLD AUTO: 15.3 X10E3/UL (ref 3.4–10.8)

## 2019-07-12 NOTE — TELEPHONE ENCOUNTER
----- Message from Jonathon De La Cruz NP sent at 7/02/2763  1:32 PM EDT -----  Please call patient and see how she is doing. WBC count is elevated, but may be due to recently finishing steroid. Platelet count is very elevated. If not feeling well she needs to go to ED. Otherwise, she needs to come in for follow-up CBC on Monday. Discussed with Dr. Joey Soria.

## 2019-07-12 NOTE — TELEPHONE ENCOUNTER
Spoke with patient who stated she is feeling okay and will go to the ED or UC if she needs too this weekend. She has an appointment with the pulmonologist on Monday but will also come into the office to have lab work redrawn. I reiterated to her over and over again the importance of going to the ED or UC if she feels any new s/s or gets worse. She verbalized her understanding.

## 2019-07-12 NOTE — PROGRESS NOTES
Please call patient and see how she is doing. WBC count is elevated, but may be due to recently finishing steroid. Platelet count is very elevated. If not feeling well she needs to go to ED. Otherwise, she needs to come in for follow-up CBC on Monday. Discussed with Dr. Evangelina Wilcox.

## 2019-07-15 DIAGNOSIS — R79.89 ELEVATED PLATELET COUNT: Primary | ICD-10-CM

## 2019-07-16 LAB
BASOPHILS # BLD AUTO: 0 X10E3/UL (ref 0–0.2)
BASOPHILS NFR BLD AUTO: 1 %
EOSINOPHIL # BLD AUTO: 0 X10E3/UL (ref 0–0.4)
EOSINOPHIL NFR BLD AUTO: 0 %
ERYTHROCYTE [DISTWIDTH] IN BLOOD BY AUTOMATED COUNT: 12 % (ref 12.3–15.4)
HCT VFR BLD AUTO: 30.2 % (ref 34–46.6)
HGB BLD-MCNC: 9.7 G/DL (ref 11.1–15.9)
IMM GRANULOCYTES # BLD AUTO: 0 X10E3/UL (ref 0–0.1)
IMM GRANULOCYTES NFR BLD AUTO: 0 %
LYMPHOCYTES # BLD AUTO: 1.6 X10E3/UL (ref 0.7–3.1)
LYMPHOCYTES NFR BLD AUTO: 24 %
MCH RBC QN AUTO: 28.9 PG (ref 26.6–33)
MCHC RBC AUTO-ENTMCNC: 32.1 G/DL (ref 31.5–35.7)
MCV RBC AUTO: 90 FL (ref 79–97)
MONOCYTES # BLD AUTO: 0.5 X10E3/UL (ref 0.1–0.9)
MONOCYTES NFR BLD AUTO: 8 %
NEUTROPHILS # BLD AUTO: 4.5 X10E3/UL (ref 1.4–7)
NEUTROPHILS NFR BLD AUTO: 67 %
PLATELET # BLD AUTO: 770 X10E3/UL (ref 150–450)
RBC # BLD AUTO: 3.36 X10E6/UL (ref 3.77–5.28)
WBC # BLD AUTO: 6.8 X10E3/UL (ref 3.4–10.8)

## 2019-07-18 ENCOUNTER — TELEPHONE (OUTPATIENT)
Dept: PRIMARY CARE CLINIC | Age: 34
End: 2019-07-18

## 2019-07-18 NOTE — PROGRESS NOTES
Please call to inform patient that platelet level is coming down and WBC count is back to normal range. Will have a repeat lab in your next appointment on 7/25/2019. Hemoglobin level is lower than last time. Please increase iron rich diet . Will have a repeat lab in your next appointment on 7/25/2019.

## 2019-07-18 NOTE — TELEPHONE ENCOUNTER
----- Message from Mariluz Schumacher MD sent at 7/18/2019  1:23 PM EDT -----  Please call to inform patient that platelet level is coming down and WBC count is back to normal range. Will have a repeat lab in your next appointment on 7/25/2019. Hemoglobin level is lower than last time. Please increase iron rich diet . Will have a repeat lab in your next appointment on 7/25/2019.

## 2019-07-25 ENCOUNTER — OFFICE VISIT (OUTPATIENT)
Dept: PRIMARY CARE CLINIC | Age: 34
End: 2019-07-25

## 2019-07-25 VITALS
HEIGHT: 64 IN | HEART RATE: 79 BPM | OXYGEN SATURATION: 97 % | WEIGHT: 173 LBS | SYSTOLIC BLOOD PRESSURE: 120 MMHG | RESPIRATION RATE: 16 BRPM | BODY MASS INDEX: 29.53 KG/M2 | DIASTOLIC BLOOD PRESSURE: 85 MMHG | TEMPERATURE: 98.3 F

## 2019-07-25 DIAGNOSIS — D64.9 LOW HEMOGLOBIN: ICD-10-CM

## 2019-07-25 DIAGNOSIS — R91.8 PULMONARY NODULES: ICD-10-CM

## 2019-07-25 DIAGNOSIS — R05.9 COUGH: Primary | ICD-10-CM

## 2019-07-25 DIAGNOSIS — D72.829 LEUKOCYTOSIS, UNSPECIFIED TYPE: ICD-10-CM

## 2019-07-25 NOTE — PROGRESS NOTES
Subjective:     Chief Complaint   Patient presents with    Cough     2 wk f/u, patient states she is \"better\"    Fatigue     getting better since last visit but still having fatigue        She  is a 26y.o. year old female is here for two weeks follow up from her last visit. Patient was admitted from 7/2/2019- 7/5/2019 with a concern about PNA. CT on admission show multiple lung nodules. VQ scan showing low probability and dopplers negative for DVT. Pulmonary and ID were consulted. Patient was treated with IV abx and steroids with nebulizer treatments. Viral panel and blood cultures negative. Unsure of the source of the PNA. She was discharged on 7/5/2019 and ID recommended Doxycycline X 14 days and steroids . Patient reports that she has been feeling lot better. She is no longer having coughing spells. Her energy level is not great yet but slowly getting there she states. In last visit pt had CBC done where platelet level was elevated. Lab Results   Component Value Date/Time    PLATELET 985 (H) 49/03/0368 11:26 AM       . Patient has an appointment with pulmonologist in end of August for follow up. Pertinent items are noted in HPI.   Objective:     Vitals:    07/25/19 1511 07/25/19 1534   BP: (!) 139/98 120/85   Pulse: 79    Resp: 16    Temp: 98.3 °F (36.8 °C)    TempSrc: Oral    SpO2: 97%    Weight: 173 lb (78.5 kg)    Height: 5' 4\" (1.626 m)        Physical Examination: General appearance - alert, well appearing, and in no distress, oriented to person, place, and time and overweight  Mental status - alert, oriented to person, place, and time, normal mood, behavior, speech, dress, motor activity, and thought processes  Ears - bilateral TM's and external ear canals normal  Nose - normal and patent, no erythema, discharge or polyps  Mouth - mucous membranes moist, pharynx normal without lesions  Neck - supple, no significant adenopathy  Chest - clear to auscultation, no wheezes, rales or rhonchi, symmetric air entry  Heart - normal rate, regular rhythm, normal S1, S2, no murmurs, rubs, clicks or gallops    Allergies   Allergen Reactions    Latex Other (comments)     Patient states she is not allergic to Latex- 7/11/19      Social History     Socioeconomic History    Marital status: SINGLE     Spouse name: Not on file    Number of children: Not on file    Years of education: Not on file    Highest education level: Not on file   Tobacco Use    Smoking status: Never Smoker    Smokeless tobacco: Never Used   Substance and Sexual Activity    Alcohol use: Not Currently     Comment: has not had a drink since being sick    Drug use: No      Family History   Problem Relation Age of Onset    Liver Disease Mother     High Cholesterol Mother     Elevated Lipids Mother     Liver Disease Father     Hypertension Father       History reviewed. No pertinent surgical history. Past Medical History:   Diagnosis Date    Back pain     L5 S1 Disk    Hypercholesterolemia       Current Outpatient Medications   Medication Sig Dispense Refill    albuterol sulfate 90 mcg/actuation aepb Take 1 Puff by inhalation every four (4) hours as needed for Other (shortness of breath). 1 Inhaler 0    norethindrone-ethinyl estradiol (JUNEL FE 1/20, 28,) 1 mg-20 mcg (21)/75 mg (7) tab Take 1 Tab by mouth daily. Assessment/ Plan:   Diagnoses and all orders for this visit:    1. Cough      - she is feeling lot better. No more coughing spells. 2. Pulmonary nodules      - Follow up with Lung specialist as scheduled . 3. Leukocytosis, unspecified type  -     CBC WITH AUTOMATED DIFF    4. Low hemoglobin  -     CBC WITH AUTOMATED DIFF     Will notify CBC result with recommendation. Medication risks/benefits/costs/interactions/alternatives discussed with patient.   Advised patient to call back or return to office if symptoms worsen/change/persist. If patient cannot reach us or should anything more severe/urgent arise he/she should proceed directly to the nearest emergency department. Discussed expected course/resolution/complications of diagnosis in detail with patient. Patient given a written after visit summary which includes her diagnoses, current medications and vitals. Patient expressed understanding with the diagnosis and plan. Follow-up and Dispositions    · Return in about 2 months (around 9/25/2019), or if symptoms worsen or fail to improve, for complete physical  and fasting blood work. Dion Stahl

## 2019-07-25 NOTE — PROGRESS NOTES
Luz Alexander is a 26 y.o. female    Chief Complaint   Patient presents with    Cough     2 wk f/u, patient states she is \"better\"    Fatigue     getting better since last visit but still having fatigue       1. Have you been to the ER, urgent care clinic since your last visit? Hospitalized since your last visit? No    2. Have you seen or consulted any other health care providers outside of the [address] [place] [address] since your last visit? Include any pap smears or colon screening. No    No flowsheet data found.      Health Maintenance Due   Topic Date Due    DTaP/Tdap/Td series (1 - Tdap) 12/13/2004    PAP AKA CERVICAL CYTOLOGY  09/16/2019

## 2019-07-26 LAB
BASOPHILS # BLD AUTO: 0 X10E3/UL (ref 0–0.2)
BASOPHILS NFR BLD AUTO: 0 %
EOSINOPHIL # BLD AUTO: 0.1 X10E3/UL (ref 0–0.4)
EOSINOPHIL NFR BLD AUTO: 1 %
ERYTHROCYTE [DISTWIDTH] IN BLOOD BY AUTOMATED COUNT: 14.2 % (ref 12.3–15.4)
HCT VFR BLD AUTO: 34.6 % (ref 34–46.6)
HGB BLD-MCNC: 11.3 G/DL (ref 11.1–15.9)
IMM GRANULOCYTES # BLD AUTO: 0 X10E3/UL (ref 0–0.1)
IMM GRANULOCYTES NFR BLD AUTO: 0 %
LYMPHOCYTES # BLD AUTO: 2.2 X10E3/UL (ref 0.7–3.1)
LYMPHOCYTES NFR BLD AUTO: 41 %
MCH RBC QN AUTO: 29.4 PG (ref 26.6–33)
MCHC RBC AUTO-ENTMCNC: 32.7 G/DL (ref 31.5–35.7)
MCV RBC AUTO: 90 FL (ref 79–97)
MONOCYTES # BLD AUTO: 0.4 X10E3/UL (ref 0.1–0.9)
MONOCYTES NFR BLD AUTO: 7 %
NEUTROPHILS # BLD AUTO: 2.7 X10E3/UL (ref 1.4–7)
NEUTROPHILS NFR BLD AUTO: 51 %
PLATELET # BLD AUTO: 526 X10E3/UL (ref 150–450)
RBC # BLD AUTO: 3.85 X10E6/UL (ref 3.77–5.28)
WBC # BLD AUTO: 5.3 X10E3/UL (ref 3.4–10.8)

## 2019-07-26 NOTE — PROGRESS NOTES
Result sent through my chart:     Abelino Ms. Gutierrez Prior,    I have received your lab results and lab looks good. Hemoglobin level is back to normal range and platelet level is lot better and almost back to normal range. Great! Will continue to monitor. Please let me know if you have any questions. Thanks.   Dr. Wendy Bettencourt

## 2019-08-23 ENCOUNTER — HOSPITAL ENCOUNTER (OUTPATIENT)
Dept: CT IMAGING | Age: 34
Discharge: HOME | End: 2019-08-23
Attending: NURSE PRACTITIONER
Payer: COMMERCIAL

## 2019-08-23 DIAGNOSIS — R93.89 ABNORMAL CT OF THE CHEST: ICD-10-CM

## 2019-08-23 PROCEDURE — 71250 CT THORAX DX C-: CPT

## 2022-03-19 PROBLEM — R06.02 SOB (SHORTNESS OF BREATH): Status: ACTIVE | Noted: 2019-07-02

## 2022-03-19 PROBLEM — K30 INDIGESTION: Status: ACTIVE | Noted: 2019-07-05

## 2022-03-19 PROBLEM — E87.1 HYPONATREMIA: Status: ACTIVE | Noted: 2019-07-05

## 2022-03-19 PROBLEM — E87.6 HYPOKALEMIA: Status: ACTIVE | Noted: 2019-07-05

## 2022-03-19 PROBLEM — J20.9 BRONCHITIS, ACUTE: Status: ACTIVE | Noted: 2019-07-05

## 2022-03-19 PROBLEM — A41.9 SEPSIS (HCC): Status: ACTIVE | Noted: 2019-07-05

## 2022-03-19 PROBLEM — N17.9 ARF (ACUTE RENAL FAILURE) (HCC): Status: ACTIVE | Noted: 2019-07-05

## 2023-05-22 RX ORDER — NORETHINDRONE ACETATE AND ETHINYL ESTRADIOL 1MG-20(21)
1 KIT ORAL DAILY
COMMUNITY